# Patient Record
Sex: FEMALE | Race: OTHER | NOT HISPANIC OR LATINO
[De-identification: names, ages, dates, MRNs, and addresses within clinical notes are randomized per-mention and may not be internally consistent; named-entity substitution may affect disease eponyms.]

---

## 2024-03-07 ENCOUNTER — NON-APPOINTMENT (OUTPATIENT)
Age: 59
End: 2024-03-07

## 2024-03-12 ENCOUNTER — APPOINTMENT (OUTPATIENT)
Dept: OBGYN | Facility: CLINIC | Age: 59
End: 2024-03-12
Payer: COMMERCIAL

## 2024-03-12 VITALS
OXYGEN SATURATION: 100 % | DIASTOLIC BLOOD PRESSURE: 79 MMHG | WEIGHT: 216 LBS | HEART RATE: 65 BPM | SYSTOLIC BLOOD PRESSURE: 125 MMHG

## 2024-03-12 DIAGNOSIS — Z82.49 FAMILY HISTORY OF ISCHEMIC HEART DISEASE AND OTHER DISEASES OF THE CIRCULATORY SYSTEM: ICD-10-CM

## 2024-03-12 DIAGNOSIS — Z80.3 FAMILY HISTORY OF MALIGNANT NEOPLASM OF BREAST: ICD-10-CM

## 2024-03-12 DIAGNOSIS — Z83.3 FAMILY HISTORY OF DIABETES MELLITUS: ICD-10-CM

## 2024-03-12 DIAGNOSIS — C90.00 MULTIPLE MYELOMA NOT HAVING ACHIEVED REMISSION: ICD-10-CM

## 2024-03-12 DIAGNOSIS — Z80.1 FAMILY HISTORY OF MALIGNANT NEOPLASM OF TRACHEA, BRONCHUS AND LUNG: ICD-10-CM

## 2024-03-12 PROBLEM — Z00.00 ENCOUNTER FOR PREVENTIVE HEALTH EXAMINATION: Status: ACTIVE | Noted: 2024-03-12

## 2024-03-12 PROCEDURE — 99205 OFFICE O/P NEW HI 60 MIN: CPT

## 2024-03-12 RX ORDER — CHOLECALCIFEROL (VITAMIN D3) 25 MCG
TABLET ORAL
Refills: 0 | Status: ACTIVE | COMMUNITY

## 2024-03-12 RX ORDER — IBUPROFEN 200 MG
0.05 TABLET ORAL
Refills: 0 | Status: ACTIVE | COMMUNITY

## 2024-03-12 RX ORDER — DIPHENHYDRAMINE HCL 25 MG
25 TABLET ORAL
Refills: 0 | Status: ACTIVE | COMMUNITY

## 2024-03-12 RX ORDER — DARATUMUMAB 100 MG/5ML
INJECTION, SOLUTION, CONCENTRATE INTRAVENOUS
Refills: 0 | Status: ACTIVE | COMMUNITY

## 2024-03-12 RX ORDER — POLYVINYL ALCOHOL 1.4 %
1.4 DROPS OPHTHALMIC (EYE)
Refills: 0 | Status: ACTIVE | COMMUNITY

## 2024-03-12 RX ORDER — ACETAMINOPHEN 325 MG/1
325 TABLET ORAL
Refills: 0 | Status: ACTIVE | COMMUNITY

## 2024-03-12 NOTE — COUNSELING
[Lab Results] : lab results [Nutrition/ Exercise/ Weight Management] : nutrition, exercise, weight management [Medication Management] : medication management [Pre/Post Op Instructions] : pre/post op instructions [Other ___] : [unfilled]

## 2024-03-12 NOTE — PHYSICAL EXAM
[Appropriately responsive] : appropriately responsive [Chaperone Present] : A chaperone was present in the examining room during all aspects of the physical examination [Alert] : alert [No Acute Distress] : no acute distress [Oriented x3] : oriented x3

## 2024-03-16 PROBLEM — Z80.1 FAMILY HISTORY OF LUNG CANCER: Status: ACTIVE | Noted: 2024-03-12

## 2024-03-16 PROBLEM — C90.00 MULTIPLE MYELOMA: Status: ACTIVE | Noted: 2024-03-12

## 2024-03-16 PROBLEM — Z80.3 FAMILY HISTORY OF MALIGNANT NEOPLASM OF BREAST: Status: ACTIVE | Noted: 2024-03-12

## 2024-03-16 PROBLEM — Z82.49 FAMILY HISTORY OF HYPERTENSION: Status: ACTIVE | Noted: 2024-03-12

## 2024-03-16 PROBLEM — Z82.49 FAMILY HISTORY OF CARDIAC DISORDER: Status: ACTIVE | Noted: 2024-03-12

## 2024-03-16 PROBLEM — Z83.3 FAMILY HISTORY OF DIABETES MELLITUS: Status: ACTIVE | Noted: 2024-03-12

## 2024-03-16 NOTE — CONSULT LETTER
[Dear  ___] : Dear  [unfilled], [Consult Letter:] : I had the pleasure of evaluating your patient, [unfilled]. [FreeTextEntry2] : Efra Moscoso MD   [address], [address],   [place] [address]

## 2024-03-16 NOTE — HISTORY OF PRESENT ILLNESS
[Patient reported mammogram was normal] : Patient reported mammogram was normal [Patient reported breast sonogram was normal] : Patient reported breast sonogram was normal [Patient reported bone density results were normal] : Patient reported bone density results were normal [Patient reported colonoscopy was normal] : Patient reported colonoscopy was normal [LMP unknown] : LMP unknown [postmenopausal] : postmenopausal [N] : Patient is not sexually active [Y] : Positive pregnancy history [Hot Flashes] : hot flashes [Night Sweats] : night sweats [Insomnia] : insomnia [No] : none [Menarche Age: ____] : age at menarche was [unfilled] [Experiencing Menopausal Sxs] : experiencing menopausal symptoms [Menopause Age: ____] : age at menopause was [unfilled] [Previously active] : previously active [FreeTextEntry1] : 59 yo patient presents for fibroid consultation. Currently she is experiencing pelvic pain and discomfort and urinary frequency. She describes at times she feels pressure and something wants to "drop out"  [Mammogramdate] : 12/28/23 [BreastSonogramDate] : 12/28/23 [BoneDensityDate] : 12/14/23 [ColonoscopyDate] : 04/09/21 [PGHxTotal] : 4 [PGHxAbortions] : 1 [[facility] Hospital[facility]ulerm] : 1 [[facility] Centeriving] : 1 [PGHxABSpont] : 1 [PGxEctopic] : 1

## 2024-03-16 NOTE — DISCUSSION/SUMMARY
[FreeTextEntry1] : I sat down with the patient to discuss the imaging findings & her symptoms which warrant surgical intervention. I explained pressure is likely related to myomata, possible adenomyosis and also enlarged uterus. Major surgical procedure including myomectomy and hysterectomy. We discussed type of hysterectomy including total and supracervical. We reviewed that there is no proven medical benefit to keeping the cervix, and removal in the future is more difficult. Myomectomy has more bleeding and adhesion formation. I ecommend definitive hysterectomy with bilateral salpingectomy via a minimally invasive approach.     The options for surgical approach including open, vaginal, and laparoscopic with or without robotic assistance were discussed and the patient agrees with plan for laparoscopic hysterectomy with bilateral salpingectomy.  The differential diagnosis was discussed in detail. The indications, risks, benefits and alternatives were discussed. Including but not limited to conversion to laparotomy, bleeding, infection, injury to surrounding organs was discussed at length.  She understand that there is increased risk for bladder injury with prior  section. Chance of occult injury and need for future surgery. MARQUIS ADRIENNE expressed an understanding of the treatment rationale and her questions were answered to her apparent satisfaction.  She was given written information about postoperative care and diagrams of the pelvic anatomy.  -immuno compromised clearance from PCP and oncologist -f/u preop visit

## 2024-04-09 ENCOUNTER — APPOINTMENT (OUTPATIENT)
Dept: OBGYN | Facility: CLINIC | Age: 59
End: 2024-04-09
Payer: COMMERCIAL

## 2024-04-09 DIAGNOSIS — Z41.9 ENCOUNTER FOR PROCEDURE FOR PURPOSES OTHER THAN REMEDYING HEALTH STATE, UNSPECIFIED: ICD-10-CM

## 2024-04-09 PROCEDURE — 99214 OFFICE O/P EST MOD 30 MIN: CPT

## 2024-04-09 RX ORDER — NAPROXEN 500 MG/1
500 TABLET ORAL
Qty: 1 | Refills: 2 | Status: ACTIVE | COMMUNITY
Start: 2024-04-09 | End: 1900-01-01

## 2024-04-09 RX ORDER — OXYCODONE 5 MG/1
5 TABLET ORAL
Qty: 5 | Refills: 0 | Status: ACTIVE | COMMUNITY
Start: 2024-04-09 | End: 1900-01-01

## 2024-04-09 NOTE — DISCUSSION/SUMMARY
[FreeTextEntry1] : MARQUIS DELEON 58 year old presents for follow-up prior to surgery. We reviewed her medical clearance. The associated risks, benefits, alternatives of the procedure have been outlined discussed and reviewed with the patient. These risks including but not limited to bleeding, infection, injury to adjacent organs, need for secondary surgery, incisional dehiscence, incisional hernia, change in bowel habits, change in urinary function, nerve injury, change in sexual function/impaired fertility, as well as the risk of heart and lung complications, stroke, DVT/PE and death were detailed.   The possibility of recurrent or continued symptoms, requiring continued medical management despite surgical intervention, was discussed.  We reviewed pain management, diet, enhanced recovery protocol. Medication prescribed.

## 2024-04-18 ENCOUNTER — TRANSCRIPTION ENCOUNTER (OUTPATIENT)
Age: 59
End: 2024-04-18

## 2024-04-18 VITALS
HEART RATE: 68 BPM | WEIGHT: 222.45 LBS | SYSTOLIC BLOOD PRESSURE: 128 MMHG | TEMPERATURE: 98 F | OXYGEN SATURATION: 99 % | HEIGHT: 69 IN | DIASTOLIC BLOOD PRESSURE: 84 MMHG | RESPIRATION RATE: 16 BRPM

## 2024-04-18 RX ORDER — CHOLECALCIFEROL (VITAMIN D3) 125 MCG
1 CAPSULE ORAL
Refills: 0 | DISCHARGE

## 2024-04-18 RX ORDER — ASPIRIN/CALCIUM CARB/MAGNESIUM 324 MG
0 TABLET ORAL
Refills: 0 | DISCHARGE

## 2024-04-18 NOTE — ASU PREOP CHECKLIST - SELECT TESTS ORDERED
HgbA1C - 5.5, Heme Clearance/CBC/CMP/PT/PTT/EKG HgbA1C - 5.5, Heme Clearance/CBC/CMP/PT/PTT/EKG/POCT Blood Glucose

## 2024-04-18 NOTE — ASU PATIENT PROFILE, ADULT - NSICDXPASTSURGICALHX_GEN_ALL_CORE_FT
PAST SURGICAL HISTORY:  H/O cervical spine surgery Titanium Cage C4-C6    H/O foot surgery B/L    History of ectopic pregnancy     S/P thyroid biopsy

## 2024-04-18 NOTE — ASU PATIENT PROFILE, ADULT - NSICDXPASTMEDICALHX_GEN_ALL_CORE_FT
PAST MEDICAL HISTORY:  H/O bleeding disorder     H/O thyroid disease      PAST MEDICAL HISTORY:  HLD (hyperlipidemia)     Multiple myeloma

## 2024-04-18 NOTE — ASU PATIENT PROFILE, ADULT - FALL HARM RISK - UNIVERSAL INTERVENTIONS
Bed in lowest position, wheels locked, appropriate side rails in place/Call bell, personal items and telephone in reach/Instruct patient to call for assistance before getting out of bed or chair/Non-slip footwear when patient is out of bed/[place] to call system/Physically safe environment - no spills, clutter or unnecessary equipment/Purposeful Proactive Rounding/Room/bathroom lighting operational, light cord in reach

## 2024-04-19 ENCOUNTER — RESULT REVIEW (OUTPATIENT)
Age: 59
End: 2024-04-19

## 2024-04-19 ENCOUNTER — APPOINTMENT (OUTPATIENT)
Dept: OBGYN | Facility: HOSPITAL | Age: 59
End: 2024-04-19

## 2024-04-19 ENCOUNTER — TRANSCRIPTION ENCOUNTER (OUTPATIENT)
Age: 59
End: 2024-04-19

## 2024-04-19 ENCOUNTER — OUTPATIENT (OUTPATIENT)
Dept: OUTPATIENT SERVICES | Facility: HOSPITAL | Age: 59
LOS: 1 days | Discharge: HOME | End: 2024-04-19
Payer: COMMERCIAL

## 2024-04-19 VITALS
DIASTOLIC BLOOD PRESSURE: 70 MMHG | TEMPERATURE: 98 F | SYSTOLIC BLOOD PRESSURE: 130 MMHG | HEART RATE: 79 BPM | OXYGEN SATURATION: 100 % | RESPIRATION RATE: 19 BRPM

## 2024-04-19 DIAGNOSIS — Z98.890 OTHER SPECIFIED POSTPROCEDURAL STATES: Chronic | ICD-10-CM

## 2024-04-19 DIAGNOSIS — Z87.59 PERSONAL HISTORY OF OTHER COMPLICATIONS OF PREGNANCY, CHILDBIRTH AND THE PUERPERIUM: Chronic | ICD-10-CM

## 2024-04-19 LAB
BLD GP AB SCN SERPL QL: POSITIVE — SIGNIFICANT CHANGE UP
GLUCOSE BLDC GLUCOMTR-MCNC: 80 MG/DL — SIGNIFICANT CHANGE UP (ref 70–99)
RH IG SCN BLD-IMP: POSITIVE — SIGNIFICANT CHANGE UP

## 2024-04-19 PROCEDURE — 86901 BLOOD TYPING SEROLOGIC RH(D): CPT

## 2024-04-19 PROCEDURE — 88302 TISSUE EXAM BY PATHOLOGIST: CPT

## 2024-04-19 PROCEDURE — 88307 TISSUE EXAM BY PATHOLOGIST: CPT

## 2024-04-19 PROCEDURE — 86880 COOMBS TEST DIRECT: CPT

## 2024-04-19 PROCEDURE — 88307 TISSUE EXAM BY PATHOLOGIST: CPT | Mod: 26

## 2024-04-19 PROCEDURE — 86850 RBC ANTIBODY SCREEN: CPT

## 2024-04-19 PROCEDURE — 86870 RBC ANTIBODY IDENTIFICATION: CPT

## 2024-04-19 PROCEDURE — 88304 TISSUE EXAM BY PATHOLOGIST: CPT

## 2024-04-19 PROCEDURE — 88304 TISSUE EXAM BY PATHOLOGIST: CPT | Mod: 26

## 2024-04-19 PROCEDURE — 86077 PHYS BLOOD BANK SERV XMATCH: CPT

## 2024-04-19 PROCEDURE — 86900 BLOOD TYPING SEROLOGIC ABO: CPT

## 2024-04-19 PROCEDURE — 86970 RBC PRETX INCUBATJ W/CHEMICL: CPT

## 2024-04-19 PROCEDURE — 58573 TLH W/T/O UTERUS OVER 250 G: CPT | Mod: 22

## 2024-04-19 PROCEDURE — 82962 GLUCOSE BLOOD TEST: CPT

## 2024-04-19 PROCEDURE — 58573 TLH W/T/O UTERUS OVER 250 G: CPT

## 2024-04-19 PROCEDURE — 88302 TISSUE EXAM BY PATHOLOGIST: CPT | Mod: 26

## 2024-04-19 PROCEDURE — C1889: CPT

## 2024-04-19 DEVICE — SURGICEL POWDER 3 GRAMS: Type: IMPLANTABLE DEVICE | Status: FUNCTIONAL

## 2024-04-19 RX ORDER — ACETAMINOPHEN 500 MG
1000 TABLET ORAL ONCE
Refills: 0 | Status: COMPLETED | OUTPATIENT
Start: 2024-04-19 | End: 2024-04-19

## 2024-04-19 RX ORDER — OXYCODONE HYDROCHLORIDE 5 MG/1
5 TABLET ORAL EVERY 4 HOURS
Refills: 0 | Status: DISCONTINUED | OUTPATIENT
Start: 2024-04-19 | End: 2024-04-19

## 2024-04-19 RX ORDER — SIMETHICONE 80 MG/1
80 TABLET, CHEWABLE ORAL EVERY 6 HOURS
Refills: 0 | Status: DISCONTINUED | OUTPATIENT
Start: 2024-04-19 | End: 2024-04-19

## 2024-04-19 RX ORDER — ZOLPIDEM TARTRATE 10 MG/1
1 TABLET ORAL
Refills: 0 | DISCHARGE

## 2024-04-19 RX ORDER — HYDROMORPHONE HYDROCHLORIDE 2 MG/ML
0.5 INJECTION INTRAMUSCULAR; INTRAVENOUS; SUBCUTANEOUS
Refills: 0 | Status: DISCONTINUED | OUTPATIENT
Start: 2024-04-19 | End: 2024-04-19

## 2024-04-19 RX ORDER — CELECOXIB 200 MG/1
400 CAPSULE ORAL ONCE
Refills: 0 | Status: COMPLETED | OUTPATIENT
Start: 2024-04-19 | End: 2024-04-19

## 2024-04-19 RX ORDER — ACETAMINOPHEN 500 MG
2 TABLET ORAL
Refills: 0 | DISCHARGE

## 2024-04-19 RX ORDER — ACETAMINOPHEN 500 MG
1000 TABLET ORAL EVERY 6 HOURS
Refills: 0 | Status: DISCONTINUED | OUTPATIENT
Start: 2024-04-19 | End: 2024-04-19

## 2024-04-19 RX ORDER — HEPARIN SODIUM 5000 [USP'U]/ML
5000 INJECTION INTRAVENOUS; SUBCUTANEOUS ONCE
Refills: 0 | Status: COMPLETED | OUTPATIENT
Start: 2024-04-19 | End: 2024-04-19

## 2024-04-19 RX ORDER — SODIUM CHLORIDE 9 MG/ML
1000 INJECTION, SOLUTION INTRAVENOUS
Refills: 0 | Status: DISCONTINUED | OUTPATIENT
Start: 2024-04-19 | End: 2024-04-19

## 2024-04-19 RX ORDER — ONDANSETRON 8 MG/1
8 TABLET, FILM COATED ORAL EVERY 6 HOURS
Refills: 0 | Status: DISCONTINUED | OUTPATIENT
Start: 2024-04-19 | End: 2024-04-19

## 2024-04-19 RX ORDER — OXYCODONE HYDROCHLORIDE 5 MG/1
10 TABLET ORAL EVERY 4 HOURS
Refills: 0 | Status: DISCONTINUED | OUTPATIENT
Start: 2024-04-19 | End: 2024-04-19

## 2024-04-19 RX ORDER — KETOROLAC TROMETHAMINE 30 MG/ML
30 SYRINGE (ML) INJECTION EVERY 6 HOURS
Refills: 0 | Status: DISCONTINUED | OUTPATIENT
Start: 2024-04-19 | End: 2024-04-19

## 2024-04-19 RX ORDER — DEXAMETHASONE 0.5 MG/5ML
10 ELIXIR ORAL
Refills: 0 | DISCHARGE

## 2024-04-19 RX ORDER — METOCLOPRAMIDE HCL 10 MG
10 TABLET ORAL EVERY 6 HOURS
Refills: 0 | Status: DISCONTINUED | OUTPATIENT
Start: 2024-04-19 | End: 2024-04-19

## 2024-04-19 RX ADMIN — CELECOXIB 400 MILLIGRAM(S): 200 CAPSULE ORAL at 07:09

## 2024-04-19 RX ADMIN — HEPARIN SODIUM 5000 UNIT(S): 5000 INJECTION INTRAVENOUS; SUBCUTANEOUS at 07:10

## 2024-04-19 RX ADMIN — Medication 10 MILLIGRAM(S): at 16:10

## 2024-04-19 RX ADMIN — ONDANSETRON 8 MILLIGRAM(S): 8 TABLET, FILM COATED ORAL at 13:24

## 2024-04-19 RX ADMIN — Medication 400 MILLIGRAM(S): at 14:51

## 2024-04-19 RX ADMIN — SIMETHICONE 80 MILLIGRAM(S): 80 TABLET, CHEWABLE ORAL at 14:54

## 2024-04-19 RX ADMIN — Medication 1000 MILLIGRAM(S): at 07:09

## 2024-04-19 NOTE — ASU DISCHARGE PLAN (ADULT/PEDIATRIC) - ASU DC SPECIAL INSTRUCTIONSFT
- Please remove umbilical dressing in 24 hours.   - Please call the office for a follow-up with your doctor in 2 weeks  - Please contact the office for any pain uncontrolled by medication, excessive bleeding or Fever>100.4  - You can take ibuprofen 600mg every 6 hours and tylenol 975mg every 6 hours as needed for pain.  - Oxycodone should be used for severe pain only  - You may walk and climb stairs as often as youd like, no vigorous activity, do not lift anything greater than 10lbs, nothing per vagina x 4 weeks, do not drive while on pain medication.

## 2024-04-19 NOTE — ASU DISCHARGE PLAN (ADULT/PEDIATRIC) - CARE PROVIDER_API CALL
Pedro Marin  Obstetrics and Gynecology  [address] [place] [id number]  Phone: [phone]  Fax: [phone]  Established Patient  Follow Up Time: 2 weeks

## 2024-04-19 NOTE — PROGRESS NOTE ADULT - ASSESSMENT
A/P:  58 year old P1 s/p [facility] BS cysto for fibroids, 16 week size uterus, EDEL (many many), enterolysis, negative leak test. Clinically and hemodynamically stable, meeting postop milestones.    Encourage ambulation  Discharge home in stable condition  Return precautions given

## 2024-04-19 NOTE — BRIEF OPERATIVE NOTE - OPERATION/FINDINGS
Significant intraabdominal adhesions of large and small bowel to anterior and posterior uterus lysed with ligasure. Unable to conclusively identify L tube and ovary. Total Laparoscopic Hysterectomy/R salpingectomy performed with Ligasure. Cysto showed bilateral ureteral jets.

## 2024-04-19 NOTE — ASU DISCHARGE PLAN (ADULT/PEDIATRIC) - NS MD DC FALL RISK RISK
For information on Fall & Injury Prevention, visit: [email or web address] OR  [email or web address] OR  https://www.cdc.gov/steadi/patient.html

## 2024-04-19 NOTE — BRIEF OPERATIVE NOTE - NSICDXBRIEFPROCEDURE_GEN_ALL_CORE_FT
PROCEDURES:  Laparoscopic total hysterectomy with bilateral salpingectomy and cystoscopy 19-Apr-2024 11:47:22  Talita Mayo  Laparoscopic lysis of adhesions with salpingectomy 19-Apr-2024 11:48:34  Talita Mayo

## 2024-04-19 NOTE — PROGRESS NOTE ADULT - SUBJECTIVE AND OBJECTIVE BOX
Patient evaluated at bedside this morning, resting comfortable in chair. She reports pain is well-controlled.  She denies headache, dizziness, changes in vision, chest pain, palpitations, shortness of breath, RUQ pain, nausea, vomiting, fever, chills, heavy vaginal bleeding.  She has been ambulating without assistance, voiding spontaneously, tolerating PO.      Physical Exam:  T(C): 36.3 (04-19-24 @ 11:55), Max: 36.8 (04-19-24 @ 07:29)  HR: 86 (04-19-24 @ 14:55) (56 - 86)  BP: 124/71 (04-19-24 @ 14:55) (100/60 - 137/81)  RR: 16 (04-19-24 @ 14:55) (16 - 19)  SpO2: 100% (04-19-24 @ 14:55) (97% - 100%)    Gen: no apparent distress  Pulm: no increased work of breathing  Abd: soft, nontender, nondistended, no rebound or guarding, uterus firm; incisions clean dry intact  Extremities: trace pedal edema bilaterally, no calf tenderness bilaterally            acetaminophen     Tablet .. 1000 milliGRAM(s) Oral every 6 hours  HYDROmorphone  Injectable 0.5 milliGRAM(s) IV Push every 15 minutes PRN  ketorolac   Injectable 30 milliGRAM(s) IV Push every 6 hours  lactated ringers. 1000 milliLiter(s) IV Continuous <Continuous>  metoclopramide Injectable 10 milliGRAM(s) IV Push every 6 hours PRN  ondansetron Injectable 8 milliGRAM(s) IV Push every 6 hours PRN  oxyCODONE    IR 10 milliGRAM(s) Oral every 4 hours PRN  oxyCODONE    IR 5 milliGRAM(s) Oral every 4 hours PRN  simethicone 80 milliGRAM(s) Chew every 6 hours PRN

## 2024-04-22 ENCOUNTER — NON-APPOINTMENT (OUTPATIENT)
Age: 59
End: 2024-04-22

## 2024-04-23 ENCOUNTER — INPATIENT (INPATIENT)
Facility: HOSPITAL | Age: 59
LOS: 2 days | Discharge: HOME | End: 2024-04-26
Attending: OBSTETRICS & GYNECOLOGY | Admitting: OBSTETRICS & GYNECOLOGY
Payer: COMMERCIAL

## 2024-04-23 ENCOUNTER — EMERGENCY (EMERGENCY)
Facility: HOSPITAL | Age: 59
LOS: 1 days | Discharge: ANOTHER HOSPITAL | End: 2024-04-23
Attending: EMERGENCY MEDICINE
Payer: COMMERCIAL

## 2024-04-23 VITALS
DIASTOLIC BLOOD PRESSURE: 74 MMHG | SYSTOLIC BLOOD PRESSURE: 130 MMHG | OXYGEN SATURATION: 98 % | HEART RATE: 75 BPM | TEMPERATURE: 99 F | RESPIRATION RATE: 18 BRPM

## 2024-04-23 VITALS
OXYGEN SATURATION: 98 % | RESPIRATION RATE: 17 BRPM | DIASTOLIC BLOOD PRESSURE: 89 MMHG | HEART RATE: 91 BPM | SYSTOLIC BLOOD PRESSURE: 160 MMHG

## 2024-04-23 VITALS
SYSTOLIC BLOOD PRESSURE: 134 MMHG | RESPIRATION RATE: 16 BRPM | OXYGEN SATURATION: 98 % | WEIGHT: 210.1 LBS | HEART RATE: 80 BPM | HEIGHT: 69 IN | DIASTOLIC BLOOD PRESSURE: 89 MMHG | TEMPERATURE: 98 F

## 2024-04-23 DIAGNOSIS — Z98.890 OTHER SPECIFIED POSTPROCEDURAL STATES: Chronic | ICD-10-CM

## 2024-04-23 DIAGNOSIS — Z87.59 PERSONAL HISTORY OF OTHER COMPLICATIONS OF PREGNANCY, CHILDBIRTH AND THE PUERPERIUM: Chronic | ICD-10-CM

## 2024-04-23 DIAGNOSIS — K56.609 UNSPECIFIED INTESTINAL OBSTRUCTION, UNSPECIFIED AS TO PARTIAL VERSUS COMPLETE OBSTRUCTION: ICD-10-CM

## 2024-04-23 LAB
ALBUMIN SERPL ELPH-MCNC: 3.5 G/DL — SIGNIFICANT CHANGE UP (ref 3.5–5)
ALBUMIN SERPL ELPH-MCNC: 3.8 G/DL — SIGNIFICANT CHANGE UP (ref 3.3–5)
ALP SERPL-CCNC: 74 U/L — SIGNIFICANT CHANGE UP (ref 40–120)
ALP SERPL-CCNC: 78 U/L — SIGNIFICANT CHANGE UP (ref 40–120)
ALT FLD-CCNC: 14 U/L — SIGNIFICANT CHANGE UP (ref 10–45)
ALT FLD-CCNC: 32 U/L DA — SIGNIFICANT CHANGE UP (ref 10–60)
ANION GAP SERPL CALC-SCNC: 11 MMOL/L — SIGNIFICANT CHANGE UP (ref 5–17)
ANION GAP SERPL CALC-SCNC: 4 MMOL/L — LOW (ref 5–17)
APPEARANCE UR: CLEAR — SIGNIFICANT CHANGE UP
AST SERPL-CCNC: 17 U/L — SIGNIFICANT CHANGE UP (ref 10–40)
AST SERPL-CCNC: 36 U/L — SIGNIFICANT CHANGE UP (ref 10–40)
BACTERIA # UR AUTO: ABNORMAL /HPF
BASOPHILS # BLD AUTO: 0.01 K/UL — SIGNIFICANT CHANGE UP (ref 0–0.2)
BASOPHILS # BLD AUTO: 0.01 K/UL — SIGNIFICANT CHANGE UP (ref 0–0.2)
BASOPHILS NFR BLD AUTO: 0.2 % — SIGNIFICANT CHANGE UP (ref 0–2)
BASOPHILS NFR BLD AUTO: 0.2 % — SIGNIFICANT CHANGE UP (ref 0–2)
BILIRUB SERPL-MCNC: 0.6 MG/DL — SIGNIFICANT CHANGE UP (ref 0.2–1.2)
BILIRUB SERPL-MCNC: 0.6 MG/DL — SIGNIFICANT CHANGE UP (ref 0.2–1.2)
BILIRUB UR-MCNC: NEGATIVE — SIGNIFICANT CHANGE UP
BLD GP AB SCN SERPL QL: POSITIVE — SIGNIFICANT CHANGE UP
BUN SERPL-MCNC: 11 MG/DL — SIGNIFICANT CHANGE UP (ref 7–18)
BUN SERPL-MCNC: 9 MG/DL — SIGNIFICANT CHANGE UP (ref 7–23)
CALCIUM SERPL-MCNC: 9.5 MG/DL — SIGNIFICANT CHANGE UP (ref 8.4–10.5)
CALCIUM SERPL-MCNC: 9.9 MG/DL — SIGNIFICANT CHANGE UP (ref 8.4–10.5)
CHLORIDE SERPL-SCNC: 108 MMOL/L — SIGNIFICANT CHANGE UP (ref 96–108)
CHLORIDE SERPL-SCNC: 109 MMOL/L — HIGH (ref 96–108)
CO2 SERPL-SCNC: 26 MMOL/L — SIGNIFICANT CHANGE UP (ref 22–31)
CO2 SERPL-SCNC: 26 MMOL/L — SIGNIFICANT CHANGE UP (ref 22–31)
COLOR SPEC: YELLOW — SIGNIFICANT CHANGE UP
COMMENT - URINE 2: SIGNIFICANT CHANGE UP
CREAT SERPL-MCNC: 0.85 MG/DL — SIGNIFICANT CHANGE UP (ref 0.5–1.3)
CREAT SERPL-MCNC: 0.95 MG/DL — SIGNIFICANT CHANGE UP (ref 0.5–1.3)
DIFF PNL FLD: ABNORMAL
EGFR: 69 ML/MIN/1.73M2 — SIGNIFICANT CHANGE UP
EGFR: 79 ML/MIN/1.73M2 — SIGNIFICANT CHANGE UP
EOSINOPHIL # BLD AUTO: 0 K/UL — SIGNIFICANT CHANGE UP (ref 0–0.5)
EOSINOPHIL # BLD AUTO: 0.03 K/UL — SIGNIFICANT CHANGE UP (ref 0–0.5)
EOSINOPHIL NFR BLD AUTO: 0 % — SIGNIFICANT CHANGE UP (ref 0–6)
EOSINOPHIL NFR BLD AUTO: 0.5 % — SIGNIFICANT CHANGE UP (ref 0–6)
EPI CELLS # UR: PRESENT
GLUCOSE SERPL-MCNC: 104 MG/DL — HIGH (ref 70–99)
GLUCOSE SERPL-MCNC: 120 MG/DL — HIGH (ref 70–99)
GLUCOSE UR QL: NEGATIVE MG/DL — SIGNIFICANT CHANGE UP
HCT VFR BLD CALC: 35.9 % — SIGNIFICANT CHANGE UP (ref 34.5–45)
HCT VFR BLD CALC: 43 % — SIGNIFICANT CHANGE UP (ref 34.5–45)
HGB BLD-MCNC: 12.1 G/DL — SIGNIFICANT CHANGE UP (ref 11.5–15.5)
HGB BLD-MCNC: 13.9 G/DL — SIGNIFICANT CHANGE UP (ref 11.5–15.5)
HYALINE CASTS # UR AUTO: PRESENT
IMM GRANULOCYTES NFR BLD AUTO: 0.2 % — SIGNIFICANT CHANGE UP (ref 0–0.9)
IMM GRANULOCYTES NFR BLD AUTO: 0.3 % — SIGNIFICANT CHANGE UP (ref 0–0.9)
KETONES UR-MCNC: ABNORMAL MG/DL
LEUKOCYTE ESTERASE UR-ACNC: ABNORMAL
LIDOCAIN IGE QN: 21 U/L — SIGNIFICANT CHANGE UP (ref 13–75)
LYMPHOCYTES # BLD AUTO: 0.77 K/UL — LOW (ref 1–3.3)
LYMPHOCYTES # BLD AUTO: 1.61 K/UL — SIGNIFICANT CHANGE UP (ref 1–3.3)
LYMPHOCYTES # BLD AUTO: 11.7 % — LOW (ref 13–44)
LYMPHOCYTES # BLD AUTO: 25.3 % — SIGNIFICANT CHANGE UP (ref 13–44)
MAGNESIUM SERPL-MCNC: 2.2 MG/DL — SIGNIFICANT CHANGE UP (ref 1.6–2.6)
MCHC RBC-ENTMCNC: 30.9 PG — SIGNIFICANT CHANGE UP (ref 27–34)
MCHC RBC-ENTMCNC: 31.5 PG — SIGNIFICANT CHANGE UP (ref 27–34)
MCHC RBC-ENTMCNC: 32.3 GM/DL — SIGNIFICANT CHANGE UP (ref 32–36)
MCHC RBC-ENTMCNC: 33.7 GM/DL — SIGNIFICANT CHANGE UP (ref 32–36)
MCV RBC AUTO: 93.5 FL — SIGNIFICANT CHANGE UP (ref 80–100)
MCV RBC AUTO: 95.6 FL — SIGNIFICANT CHANGE UP (ref 80–100)
MONOCYTES # BLD AUTO: 0.19 K/UL — SIGNIFICANT CHANGE UP (ref 0–0.9)
MONOCYTES # BLD AUTO: 0.67 K/UL — SIGNIFICANT CHANGE UP (ref 0–0.9)
MONOCYTES NFR BLD AUTO: 10.5 % — SIGNIFICANT CHANGE UP (ref 2–14)
MONOCYTES NFR BLD AUTO: 2.9 % — SIGNIFICANT CHANGE UP (ref 2–14)
NEUTROPHILS # BLD AUTO: 4.03 K/UL — SIGNIFICANT CHANGE UP (ref 1.8–7.4)
NEUTROPHILS # BLD AUTO: 5.58 K/UL — SIGNIFICANT CHANGE UP (ref 1.8–7.4)
NEUTROPHILS NFR BLD AUTO: 63.2 % — SIGNIFICANT CHANGE UP (ref 43–77)
NEUTROPHILS NFR BLD AUTO: 85 % — HIGH (ref 43–77)
NITRITE UR-MCNC: NEGATIVE — SIGNIFICANT CHANGE UP
NRBC # BLD: 0 /100 WBCS — SIGNIFICANT CHANGE UP (ref 0–0)
NRBC # BLD: 0 /100 WBCS — SIGNIFICANT CHANGE UP (ref 0–0)
PH UR: 6.5 — SIGNIFICANT CHANGE UP (ref 5–8)
PHOSPHATE SERPL-MCNC: 3.4 MG/DL — SIGNIFICANT CHANGE UP (ref 2.5–4.5)
PLATELET # BLD AUTO: 170 K/UL — SIGNIFICANT CHANGE UP (ref 150–400)
PLATELET # BLD AUTO: 185 K/UL — SIGNIFICANT CHANGE UP (ref 150–400)
POTASSIUM SERPL-MCNC: 3.7 MMOL/L — SIGNIFICANT CHANGE UP (ref 3.5–5.3)
POTASSIUM SERPL-MCNC: 5.3 MMOL/L — SIGNIFICANT CHANGE UP (ref 3.5–5.3)
POTASSIUM SERPL-SCNC: 3.7 MMOL/L — SIGNIFICANT CHANGE UP (ref 3.5–5.3)
POTASSIUM SERPL-SCNC: 5.3 MMOL/L — SIGNIFICANT CHANGE UP (ref 3.5–5.3)
PROT SERPL-MCNC: 5.7 G/DL — LOW (ref 6–8.3)
PROT SERPL-MCNC: 6.8 G/DL — SIGNIFICANT CHANGE UP (ref 6–8.3)
PROT UR-MCNC: NEGATIVE MG/DL — SIGNIFICANT CHANGE UP
RBC # BLD: 3.84 M/UL — SIGNIFICANT CHANGE UP (ref 3.8–5.2)
RBC # BLD: 4.5 M/UL — SIGNIFICANT CHANGE UP (ref 3.8–5.2)
RBC # FLD: 15 % — HIGH (ref 10.3–14.5)
RBC # FLD: 15.1 % — HIGH (ref 10.3–14.5)
RBC CASTS # UR COMP ASSIST: 3 /HPF — SIGNIFICANT CHANGE UP (ref 0–4)
RH IG SCN BLD-IMP: POSITIVE — SIGNIFICANT CHANGE UP
SODIUM SERPL-SCNC: 139 MMOL/L — SIGNIFICANT CHANGE UP (ref 135–145)
SODIUM SERPL-SCNC: 145 MMOL/L — SIGNIFICANT CHANGE UP (ref 135–145)
SP GR SPEC: 1.02 — SIGNIFICANT CHANGE UP (ref 1–1.03)
UROBILINOGEN FLD QL: 0.2 MG/DL — SIGNIFICANT CHANGE UP (ref 0.2–1)
WBC # BLD: 6.37 K/UL — SIGNIFICANT CHANGE UP (ref 3.8–10.5)
WBC # BLD: 6.56 K/UL — SIGNIFICANT CHANGE UP (ref 3.8–10.5)
WBC # FLD AUTO: 6.37 K/UL — SIGNIFICANT CHANGE UP (ref 3.8–10.5)
WBC # FLD AUTO: 6.56 K/UL — SIGNIFICANT CHANGE UP (ref 3.8–10.5)
WBC UR QL: 3 /HPF — SIGNIFICANT CHANGE UP (ref 0–5)

## 2024-04-23 PROCEDURE — 71045 X-RAY EXAM CHEST 1 VIEW: CPT | Mod: 26

## 2024-04-23 PROCEDURE — 99291 CRITICAL CARE FIRST HOUR: CPT

## 2024-04-23 PROCEDURE — 99283 EMERGENCY DEPT VISIT LOW MDM: CPT

## 2024-04-23 PROCEDURE — 99223 1ST HOSP IP/OBS HIGH 75: CPT | Mod: 57

## 2024-04-23 PROCEDURE — ZZZZZ: CPT

## 2024-04-23 PROCEDURE — 74177 CT ABD & PELVIS W/CONTRAST: CPT | Mod: 26,MC

## 2024-04-23 RX ORDER — SODIUM CHLORIDE 9 MG/ML
1000 INJECTION INTRAMUSCULAR; INTRAVENOUS; SUBCUTANEOUS ONCE
Refills: 0 | Status: COMPLETED | OUTPATIENT
Start: 2024-04-23 | End: 2024-04-23

## 2024-04-23 RX ORDER — ACETAMINOPHEN 500 MG
1000 TABLET ORAL ONCE
Refills: 0 | Status: COMPLETED | OUTPATIENT
Start: 2024-04-23 | End: 2024-04-23

## 2024-04-23 RX ORDER — MORPHINE SULFATE 50 MG/1
4 CAPSULE, EXTENDED RELEASE ORAL ONCE
Refills: 0 | Status: DISCONTINUED | OUTPATIENT
Start: 2024-04-23 | End: 2024-04-23

## 2024-04-23 RX ORDER — PANTOPRAZOLE SODIUM 20 MG/1
40 TABLET, DELAYED RELEASE ORAL EVERY 24 HOURS
Refills: 0 | Status: DISCONTINUED | OUTPATIENT
Start: 2024-04-24 | End: 2024-04-26

## 2024-04-23 RX ORDER — HEPARIN SODIUM 5000 [USP'U]/ML
5000 INJECTION INTRAVENOUS; SUBCUTANEOUS EVERY 8 HOURS
Refills: 0 | Status: DISCONTINUED | OUTPATIENT
Start: 2024-04-23 | End: 2024-04-23

## 2024-04-23 RX ORDER — ACETAMINOPHEN 500 MG
1000 TABLET ORAL ONCE
Refills: 0 | Status: COMPLETED | OUTPATIENT
Start: 2024-04-23 | End: 2024-04-25

## 2024-04-23 RX ORDER — SODIUM CHLORIDE 9 MG/ML
1000 INJECTION, SOLUTION INTRAVENOUS
Refills: 0 | Status: DISCONTINUED | OUTPATIENT
Start: 2024-04-23 | End: 2024-04-25

## 2024-04-23 RX ORDER — ONDANSETRON 8 MG/1
4 TABLET, FILM COATED ORAL ONCE
Refills: 0 | Status: COMPLETED | OUTPATIENT
Start: 2024-04-23 | End: 2024-04-23

## 2024-04-23 RX ORDER — ONDANSETRON 8 MG/1
8 TABLET, FILM COATED ORAL EVERY 6 HOURS
Refills: 0 | Status: DISCONTINUED | OUTPATIENT
Start: 2024-04-23 | End: 2024-04-26

## 2024-04-23 RX ORDER — HEPARIN SODIUM 5000 [USP'U]/ML
5000 INJECTION INTRAVENOUS; SUBCUTANEOUS EVERY 8 HOURS
Refills: 0 | Status: DISCONTINUED | OUTPATIENT
Start: 2024-04-23 | End: 2024-04-26

## 2024-04-23 RX ADMIN — SODIUM CHLORIDE 1000 MILLILITER(S): 9 INJECTION INTRAMUSCULAR; INTRAVENOUS; SUBCUTANEOUS at 15:27

## 2024-04-23 RX ADMIN — SODIUM CHLORIDE 125 MILLILITER(S): 9 INJECTION, SOLUTION INTRAVENOUS at 21:22

## 2024-04-23 RX ADMIN — Medication 1000 MILLIGRAM(S): at 16:08

## 2024-04-23 RX ADMIN — HEPARIN SODIUM 5000 UNIT(S): 5000 INJECTION INTRAVENOUS; SUBCUTANEOUS at 23:16

## 2024-04-23 RX ADMIN — ONDANSETRON 4 MILLIGRAM(S): 8 TABLET, FILM COATED ORAL at 08:54

## 2024-04-23 RX ADMIN — Medication 1000 MILLIGRAM(S): at 09:14

## 2024-04-23 RX ADMIN — Medication 400 MILLIGRAM(S): at 08:59

## 2024-04-23 RX ADMIN — ONDANSETRON 4 MILLIGRAM(S): 8 TABLET, FILM COATED ORAL at 15:22

## 2024-04-23 RX ADMIN — SODIUM CHLORIDE 1000 MILLILITER(S): 9 INJECTION INTRAMUSCULAR; INTRAVENOUS; SUBCUTANEOUS at 10:08

## 2024-04-23 RX ADMIN — Medication 1000 MILLIGRAM(S): at 16:23

## 2024-04-23 RX ADMIN — SODIUM CHLORIDE 1000 MILLILITER(S): 9 INJECTION INTRAMUSCULAR; INTRAVENOUS; SUBCUTANEOUS at 16:27

## 2024-04-23 RX ADMIN — Medication 1000 MILLIGRAM(S): at 09:29

## 2024-04-23 RX ADMIN — SODIUM CHLORIDE 1000 MILLILITER(S): 9 INJECTION INTRAMUSCULAR; INTRAVENOUS; SUBCUTANEOUS at 08:49

## 2024-04-23 RX ADMIN — Medication 400 MILLIGRAM(S): at 15:53

## 2024-04-23 NOTE — CONSULT NOTE ADULT - TIME BILLING
evaluation and management of small bowel obstruction, review of labs and imaging, discussion with patient, discussion with consultants, documentation

## 2024-04-23 NOTE — CONSULT NOTE ADULT - PROBLEM SELECTOR RECOMMENDATION 9
- patient will be followed by general surgery   - no gyn intervention at this time   - gyn team available if patient condition changes   - patient evaluated with Dr. Mccoy, house attending

## 2024-04-23 NOTE — CONSULT NOTE ADULT - ATTENDING COMMENTS
Patient is a 59 year old woman with history of hysterectomy on 4/19/24 who developed pain, nausea, vomiting over past day and has clinical, laboratory, radiographic findings concerning for small bowel obstruction. At time of evaluation, afebrile, hemodynamically stable, abdomen soft, minimal tenderness, mild distension, no rebound or guarding, incisions c/d/i. CT imaging reviewed demonstrates transition point in pelvis, post operative changes, not concerning for ischemia. Plan for admission, bowel rest, IV fluids, NG tube decompression, awaiting return of bowel function. Late entry, date of service 4/23/24.

## 2024-04-23 NOTE — H&P ADULT - ASSESSMENT
Pt is a 60 yo P0  POD4 s/p [facility] BS now presenting from [place] ED for high grade SBO.  - clinically and hemodynamically stable  - Obtain general surgery consult - for now, conservative management  - NPO w/ IV fluids and NGT to LIWS      Discussed with PGY4 Dr. Corrales and attending Dr. Marin.   Riya Jacinto, PGY2

## 2024-04-23 NOTE — CONSULT NOTE ADULT - ASSESSMENT
A/P: 58 yo POD#4 s/p total laparoscopic hysterectomy/bilateral salpingectomy presents to the ED for nausea, vomiting and abdominal pain x 1 day   Patient appears to have bowel obstruction  No gynecologic condition present at this time    - patient will be followed by general surgery   - no gyn intervention at this time   - gyn team available if patient condition changes   - patient evaluated with Dr. Mccoy, house attending      A/P: 60 yo POD#4 s/p total laparoscopic hysterectomy/bilateral salpingectomy presents to the ED for nausea, vomiting and abdominal pain x 1 day   Patient appears to have bowel obstruction, no leukocytosis, no suspicion of bowel perforation   No gynecologic condition present at this time    - patient will be followed by general surgery   - no gyn intervention at this time   - gyn team available if patient condition changes   - patient evaluated with Dr. Mccoy, house attending

## 2024-04-23 NOTE — H&P ADULT - HISTORY OF PRESENT ILLNESS
Pt is a 58 yo P0  POD4 s/p [facility] BS now presenting from [place] ED for high grade SBO. She states she was feeling well post operatively, only requiring tylenol for two days. Today she started to have severe abdominal pain, > 20 episodes of emesis, and one episode of diarrhea.        OB/GYN Hx: G1 - ectopic pregnancy.   PMHx: multiple myeloma, stopped medications 4/4 prior to surgery  SHx: UAE, L/S salpingectomy, cervical vertebral fusion  Meds: reglamid  10 mg, daratumumab, ASA 81, benadryl for insomnia  Allergies: NKDA    PHYSICAL EXAM:   Vital Signs Last 24 Hrs  T(C): --  T(F): --  HR: 91 (23 Apr 2024 21:00) (91 - 91)  BP: 160/89 (23 Apr 2024 21:00) (160/89 - 160/89)  BP(mean): 113 (23 Apr 2024 21:00) (113 - 113)  RR: 17 (23 Apr 2024 21:00) (17 - 17)  SpO2: 98% (23 Apr 2024 21:00) (98% - 98%)    Parameters below as of 23 Apr 2024 21:00  Patient On (Oxygen Delivery Method): room air        **************************  Constitutional: Alert & Oriented x3, No acute distress  Respiratory: no increased WOB  Gastrointestinal: soft, non tender, nondistended, no rebound or guarding. Lower abdominal bowel sounds hypoactive, RUQ BS hyperactive.   Extremities: no calf tenderness or swelling    LABS:                  RADIOLOGY & ADDITIONAL STUDIES:   Pt is a 60 yo P0  POD4 s/p [facility] BS now presenting from [place] ED for high grade SBO. She states she was feeling well post operatively, only requiring tylenol for two days. Today she started to have severe abdominal pain, > 20 episodes of emesis, and one episode of diarrhea. She had a CT performed in the ED showing high grade SBO and a NG tube was placed. She has not had emesis since then.        OB/GYN Hx: G1 - ectopic pregnancy.   PMHx: multiple myeloma, stopped medications 4/4 prior to surgery  SHx: UAE, L/S salpingectomy, cervical vertebral fusion, 4/19/24 [facility] BS  Meds: reglamid  10 mg, daratumumab, ASA 81, benadryl for insomnia  Allergies: NKDA    PHYSICAL EXAM:   Vital Signs Last 24 Hrs  T(C): --  T(F): --  HR: 91 (23 Apr 2024 21:00) (91 - 91)  BP: 160/89 (23 Apr 2024 21:00) (160/89 - 160/89)  BP(mean): 113 (23 Apr 2024 21:00) (113 - 113)  RR: 17 (23 Apr 2024 21:00) (17 - 17)  SpO2: 98% (23 Apr 2024 21:00) (98% - 98%)    Parameters below as of 23 Apr 2024 21:00  Patient On (Oxygen Delivery Method): room air        **************************  Constitutional: Alert & Oriented x3, No acute distress  Respiratory: no increased WOB  Gastrointestinal: soft, non tender, nondistended, no rebound or guarding. Lower abdominal bowel sounds hypoactive, RUQ BS hyperactive.   Extremities: no calf tenderness or swelling    LABS:                  RADIOLOGY & ADDITIONAL STUDIES:    < from: CT Abdomen and Pelvis w/ IV Cont (04.23.24 @ 10:26) >    ACC: [id number] EXAM:  CT ABDOMEN AND PELVIS IC   ORDERED BY: MARYELLEN BENITEZ     PROCEDURE DATE:  04/23/2024          INTERPRETATION:  CLINICAL INFORMATION: Abdominal pain and vomiting status   post hysterectomy 4 days previously    COMPARISON: None.    CONTRAST/COMPLICATIONS:  IV Contrast: Omnipaque 350  90 cc administered   10 cc discarded  Oral Contrast: NONE  Complications: None reported at time of study completion    PROCEDURE:  CT of the Abdomen and Pelvis was performed.  Sagittal and coronal reformats were performed.    FINDINGS:  LOWER CHEST: Nonspecific bilateral breast skin thickening.    LIVER: Indeterminate peripheral 3.6 x 3.0 cm hypoattenuating segment 8/5   hepatic lesion associated with capsular retraction.  BILE DUCTS: Normal caliber.  GALLBLADDER: Within normal limits.  SPLEEN: Within normal limits.  PANCREAS: Within normal limits.  ADRENALS: Within normal limits.  KIDNEYS/URETERS: Within normal limits.    BLADDER: Within normal limits.  REPRODUCTIVE ORGANS: Hysterectomy.    BOWEL: High-grade small bowel obstruction, upper transition point in the   central pelvis (2, 115) where there is incomplete twisting of the   mesentery. Mild upstream small bowel obstruction and complete downstream   collapse.  Appendix not identified.  PERITONEUM: Small volume slightly hyperdense ascites and mesenteric fluid.  VESSELS: Atherosclerotic changes.  RETROPERITONEUM/LYMPH NODES: No lymphadenopathy.  ABDOMINAL WALL: Minimal postoperative changes  BONES: Numerous small lucencies scattered throughout the visualized   osseous structures.    IMPRESSION:  1.  High-grade small bowel obstruction associated with small volume   hyperdense ascites and mesenteric fluid. Consider possibility of   developing ischemia.  2.  Indeterminate 3.6 cm liver lesion, advise MRI with and without   contrast for further assessment.  3.  Numerous small lucencies scattered throughout the visualized osseous   structures. Consider heterogeneous osteopenia, myeloma, or metastatic   disease. Further workup as clinically directed and may include MRI of the   spine and/or pelvis.   Pt is a 60 yo P0  POD4 s/p [facility] BS transfered from [place] ED for high grade SBO. She states she was feeling well post operatively, only requiring tylenol for two days. Today she started to have severe abdominal pain, > 20 episodes of emesis, and one episode of diarrhea. She had a CT performed in the ED showing high grade SBO and a NG tube was placed. She has not had emesis since then.        OB/GYN Hx: G1 - ectopic pregnancy.   PMHx: multiple myeloma, stopped medications 4/4 prior to surgery  SHx: UAE, L/S salpingectomy, cervical vertebral fusion, 4/19/24 [facility] BS  Meds: reglamid  10 mg, daratumumab, ASA 81, benadryl for insomnia  Allergies: NKDA    PHYSICAL EXAM:   Vital Signs Last 24 Hrs  T(C): --  T(F): --  HR: 91 (23 Apr 2024 21:00) (91 - 91)  BP: 160/89 (23 Apr 2024 21:00) (160/89 - 160/89)  BP(mean): 113 (23 Apr 2024 21:00) (113 - 113)  RR: 17 (23 Apr 2024 21:00) (17 - 17)  SpO2: 98% (23 Apr 2024 21:00) (98% - 98%)    Parameters below as of 23 Apr 2024 21:00  Patient On (Oxygen Delivery Method): room air        **************************  Constitutional: Alert & Oriented x3, No acute distress  Respiratory: no increased WOB  Gastrointestinal: soft, non tender, nondistended, no rebound or guarding. Lower abdominal bowel sounds hypoactive, RUQ BS hyperactive.   Extremities: no calf tenderness or swelling    LABS:                  RADIOLOGY & ADDITIONAL STUDIES:    < from: CT Abdomen and Pelvis w/ IV Cont (04.23.24 @ 10:26) >    ACC: [id number] EXAM:  CT ABDOMEN AND PELVIS IC   ORDERED BY: MARYELLEN BENITEZ     PROCEDURE DATE:  04/23/2024          INTERPRETATION:  CLINICAL INFORMATION: Abdominal pain and vomiting status   post hysterectomy 4 days previously    COMPARISON: None.    CONTRAST/COMPLICATIONS:  IV Contrast: Omnipaque 350  90 cc administered   10 cc discarded  Oral Contrast: NONE  Complications: None reported at time of study completion    PROCEDURE:  CT of the Abdomen and Pelvis was performed.  Sagittal and coronal reformats were performed.    FINDINGS:  LOWER CHEST: Nonspecific bilateral breast skin thickening.    LIVER: Indeterminate peripheral 3.6 x 3.0 cm hypoattenuating segment 8/5   hepatic lesion associated with capsular retraction.  BILE DUCTS: Normal caliber.  GALLBLADDER: Within normal limits.  SPLEEN: Within normal limits.  PANCREAS: Within normal limits.  ADRENALS: Within normal limits.  KIDNEYS/URETERS: Within normal limits.    BLADDER: Within normal limits.  REPRODUCTIVE ORGANS: Hysterectomy.    BOWEL: High-grade small bowel obstruction, upper transition point in the   central pelvis (2, 115) where there is incomplete twisting of the   mesentery. Mild upstream small bowel obstruction and complete downstream   collapse.  Appendix not identified.  PERITONEUM: Small volume slightly hyperdense ascites and mesenteric fluid.  VESSELS: Atherosclerotic changes.  RETROPERITONEUM/LYMPH NODES: No lymphadenopathy.  ABDOMINAL WALL: Minimal postoperative changes  BONES: Numerous small lucencies scattered throughout the visualized   osseous structures.    IMPRESSION:  1.  High-grade small bowel obstruction associated with small volume   hyperdense ascites and mesenteric fluid. Consider possibility of   developing ischemia.  2.  Indeterminate 3.6 cm liver lesion, advise MRI with and without   contrast for further assessment.  3.  Numerous small lucencies scattered throughout the visualized osseous   structures. Consider heterogeneous osteopenia, myeloma, or metastatic   disease. Further workup as clinically directed and may include MRI of the   spine and/or pelvis.

## 2024-04-23 NOTE — ED PROVIDER NOTE - PHYSICAL EXAMINATION
Abdomen soft upper abdominal tenderness palpation 3 laparoscopic incisions look close no surrounding erythema no dehiscence.

## 2024-04-23 NOTE — PATIENT PROFILE ADULT - FUNCTIONAL ASSESSMENT - BASIC MOBILITY ASSESSMENT TYPE
Patricia robles has her scheduled 8/23. She had an emergency surgery so she could not see her the prior date but will be getting her in.    Admission

## 2024-04-23 NOTE — ED PROVIDER NOTE - NSRISKOFTRANSFER_ED_A_ED
Writer has reviewed status with Dr. Cheli SUAREZ for patient to be referred to radiation oncology.  Order, referral were previously placed.  Writer has contacted radiation oncology to reach out to patient to schedule.  Patient verbalizes understanding and many thanks.   
Deterioration of Condition En Route/Increased Pain/Transportation Risk (There is always a risk of traffic delays resulting in deterioration of condition.)

## 2024-04-23 NOTE — H&P ADULT - ATTENDING COMMENTS
Gyn attg:     Patient POD#4 presented to ED with nausea and vomiting, episode of diarrhea and abdominal pain. CT scan showed SBO transferred to [facility] site of original surgery for management. Surgery consult called. NPO, NGT, close observation. Plan of care reviewed with patient. I was reviewed finding and communication with patient throughout the day prior to transfer. Once CT finding reported and spoke with surgery team at  arranged for transfer.    Pedro Marin MD

## 2024-04-23 NOTE — H&P ADULT - ATTENDING SUPERVISION STATEMENT
What Is The Reason For Today's Visit?: Excessive sun exposure Additional History: 6 month skin check Resident

## 2024-04-23 NOTE — ED ADULT NURSE NOTE - NSFALLRISKFACTORS_ED_ALL_ED
hysterectomy/Surgery: Recent surgery, recent lower limb amputation, major abdominal or thoracic surgery

## 2024-04-23 NOTE — CONSULT NOTE ADULT - SUBJECTIVE AND OBJECTIVE BOX
HPI:  Pt is a 60 yo P0  POD4 s/p TLH BS now presenting from [facility] for high grade SBO. She states she was feeling well post operatively, only requiring tylenol for two days. Today she started to have severe abdominal pain, > 20 episodes of emesis, and one episode of diarrhea.      OB/GYN Hx: G1 - ectopic pregnancy.   PMHx: multiple myeloma, stopped medications 4/4 prior to surgery  SHx: UAE, L/S salpingectomy, cervical vertebral fusion  Meds: reglamid  10 mg, daratumumab, ASA 81, benadryl for insomnia  Allergies: NKDA    PHYSICAL EXAM:   Vital Signs Last 24 Hrs  T(C): --  T(F): --  HR: 91 (23 Apr 2024 21:00) (91 - 91)  BP: 160/89 (23 Apr 2024 21:00) (160/89 - 160/89)  BP(mean): 113 (23 Apr 2024 21:00) (113 - 113)  RR: 17 (23 Apr 2024 21:00) (17 - 17)  SpO2: 98% (23 Apr 2024 21:00) (98% - 98%)    Parameters below as of 23 Apr 2024 21:00  Patient On (Oxygen Delivery Method): room air        **************************  Constitutional: Alert & Oriented x3, No acute distress  Respiratory: no increased WOB  Gastrointestinal: soft, non tender, nondistended, no rebound or guarding. Lower abdominal bowel sounds hypoactive, RUQ BS hyperactive.   Extremities: no calf tenderness or swelling    LABS:                  RADIOLOGY & ADDITIONAL STUDIES:   (23 Apr 2024 21:44)      SURGERY ADDENDUM  The patient is a 59 year old female with a PMHx of myeloma (on PO and IV chemotherapy) and PSHx of anterior cervical spinal fusion now s/p laparoscopic hysterectomy with extensive EDEL on 4/19/24 now presenting with abdominal pain, nausea/vomiting x 1 day.   General surgery consulted for high grade SBO on CTAP.   The patient states that on POD 3 she began having abdominal discomfort that continued to worsen with multiple episodes of nbnb emesis. The patient was unable to tolerate PO intake including fluids or pain meds, which prompted her going to local ED. She was admitted and NGT placed with 600cc of gastric return. Patient denies chest pain/shortness of breath, fever/chills, calf pain. She states she had one episode of diarrhea prior to admission at OSH, no flatus.     PMHx - myeloma (s/p PO and IV chemotherapy) , fibroids  PSHx - anterior cervical spinal fusion now s/p laparoscopic hysterectomy with extensive EDEL  Meds -  reglamid 10 mg, daratumumab, ASA 81, benadryl for insomnia    PAST MEDICAL & SURGICAL HISTORY:  HLD (hyperlipidemia)      Multiple myeloma      S/P thyroid biopsy      History of ectopic pregnancy      H/O foot surgery  B/L      H/O cervical spine surgery  Titanium Cage C4-C6          MEDICATIONS  (STANDING):  acetaminophen   IVPB .. 1000 milliGRAM(s) IV Intermittent once  lactated ringers. 1000 milliLiter(s) (125 mL/Hr) IV Continuous <Continuous>    MEDICATIONS  (PRN):      Allergies    No Known Allergies    Intolerances        SOCIAL HISTORY:    FAMILY HISTORY:      Vital Signs Last 24 Hrs  T(C): --  T(F): --  HR: 91 (23 Apr 2024 21:00) (91 - 91)  BP: 160/89 (23 Apr 2024 21:00) (160/89 - 160/89)  BP(mean): 113 (23 Apr 2024 21:00) (113 - 113)  RR: 17 (23 Apr 2024 21:00) (17 - 17)  SpO2: 98% (23 Apr 2024 21:00) (98% - 98%)    Parameters below as of 23 Apr 2024 21:00  Patient On (Oxygen Delivery Method): room air        PHYSICAL EXAM:   General: Patient is doing well and lying in bed comfortably  Constitutional: alert and oriented   Pulm: Nonlabored breathing, no respiratory distress; NGT in place  CV: Regular rate and rhythm, normal sinus rhythm  Abd: soft, minimally tender, nondistended. No rebound, no guarding  Extremities: warm, well perfused, no edema    LABS:                RADIOLOGY & ADDITIONAL STUDIES:       HPI:  Pt is a 58 yo P0  POD4 s/p TLH BS now presenting from [facility] ED for high grade SBO. She states she was feeling well post operatively, only requiring tylenol for two days. Today she started to have severe abdominal pain, > 20 episodes of emesis, and one episode of diarrhea.      OB/GYN Hx: G1 - ectopic pregnancy.   PMHx: multiple myeloma, stopped medications 4/4 prior to surgery  SHx: UAE, L/S salpingectomy, cervical vertebral fusion  Meds: reglamid  10 mg, daratumumab, ASA 81, benadryl for insomnia  Allergies: NKDA    PHYSICAL EXAM:   Vital Signs Last 24 Hrs  T(C): --  T(F): --  HR: 91 (23 Apr 2024 21:00) (91 - 91)  BP: 160/89 (23 Apr 2024 21:00) (160/89 - 160/89)  BP(mean): 113 (23 Apr 2024 21:00) (113 - 113)  RR: 17 (23 Apr 2024 21:00) (17 - 17)  SpO2: 98% (23 Apr 2024 21:00) (98% - 98%)    Parameters below as of 23 Apr 2024 21:00  Patient On (Oxygen Delivery Method): room air        **************************  Constitutional: Alert & Oriented x3, No acute distress  Respiratory: no increased WOB  Gastrointestinal: soft, non tender, nondistended, no rebound or guarding. Lower abdominal bowel sounds hypoactive, RUQ BS hyperactive.   Extremities: no calf tenderness or swelling    LABS:                  RADIOLOGY & ADDITIONAL STUDIES:   (23 Apr 2024 21:44)      SURGERY ADDENDUM  The patient is a 59 year old female with a PMHx of myeloma (on PO and IV chemotherapy) and PSHx of anterior cervical spinal fusion now s/p laparoscopic hysterectomy with extensive EDEL on 4/19/24 now presenting with abdominal pain, nausea/vomiting x 1 day.   General surgery consulted for high grade SBO on CTAP.   The patient states that on POD 3 she began having abdominal discomfort that continued to worsen with multiple episodes of nbnb emesis. The patient was unable to tolerate PO intake including fluids or pain meds, which prompted her going to local ED. She was admitted and NGT placed with 600cc of gastric return. Patient denies chest pain/shortness of breath, fever/chills, calf pain. She states she had one episode of diarrhea prior to admission at OSH, no flatus.     PMHx - myeloma (s/p PO and IV chemotherapy) , fibroids  PSHx - anterior cervical spinal fusion now s/p laparoscopic hysterectomy with extensive EDEL  Meds -  reglamid 10 mg, daratumumab, ASA 81, benadryl for insomnia    PAST MEDICAL & SURGICAL HISTORY:  HLD (hyperlipidemia)      Multiple myeloma      S/P thyroid biopsy      History of ectopic pregnancy      H/O foot surgery  B/L      H/O cervical spine surgery  Titanium Cage C4-C6          MEDICATIONS  (STANDING):  acetaminophen   IVPB .. 1000 milliGRAM(s) IV Intermittent once  lactated ringers. 1000 milliLiter(s) (125 mL/Hr) IV Continuous <Continuous>    MEDICATIONS  (PRN):      Allergies    No Known Allergies    Intolerances        SOCIAL HISTORY:    FAMILY HISTORY:      Vital Signs Last 24 Hrs  T(C): --  T(F): --  HR: 91 (23 Apr 2024 21:00) (91 - 91)  BP: 160/89 (23 Apr 2024 21:00) (160/89 - 160/89)  BP(mean): 113 (23 Apr 2024 21:00) (113 - 113)  RR: 17 (23 Apr 2024 21:00) (17 - 17)  SpO2: 98% (23 Apr 2024 21:00) (98% - 98%)    Parameters below as of 23 Apr 2024 21:00  Patient On (Oxygen Delivery Method): room air        PHYSICAL EXAM:   General: Patient is doing well and lying in bed comfortably  Constitutional: alert and oriented   Pulm: Nonlabored breathing, no respiratory distress; NGT in place  CV: Regular rate and rhythm, normal sinus rhythm  Abd: soft, minimally tender, primarily around incisions, softly distended. No rebound, no guarding  Incisions: c/d/i, no erythema or drainage  Extremities: warm, well perfused, no edema    LABS:                RADIOLOGY & ADDITIONAL STUDIES:

## 2024-04-23 NOTE — ED PROVIDER NOTE - OBJECTIVE STATEMENT
59-year-old female history of hysterectomy 4 days ago presents with upper abdominal pain vomiting and diarrhea since yesterday.  Patient states after that laparoscopic hysterectomy she was feeling fine with no pain and only taking Tylenol as needed.  Denies any urinary symptoms states she has been urinating normally.

## 2024-04-23 NOTE — CONSULT NOTE ADULT - SUBJECTIVE AND OBJECTIVE BOX
GENERAL SURGERY CONSULT  59-year-old female with past medical history of myeloma (currently on PO and IV chemotherapy) and past surgical history of cervical spine surgery w/ anterior approach and is now s/p hysterectomy POD#4 presents to the ED with upper abdominal pain associated with vomiting and diarrhea since 2am. General surgery consulted for CT findings of high-grade SBO. Pt seen and examined at bedside. Patient states after elective laparoscopic hysterectomy 2/2 pelvic pain/fibroids she was feeling fine with no pain and only taking Tylenol as needed. On POD#3 pt began having abdominal discomfort which continually worsened with multiple episodes of NBNB vomiting. Pt unable to tolerate PO fluids / medicine which prompted ED visit. Denies severe abd pain, constipation. Pt denies prior abdominal surgeries or instance of SBO. Pt denies known abdominal masses, continually follows with oncologist Dr. Dodge ([phone]).     PAST MEDICAL & SURGICAL HISTORY:      MEDICATIONS  (STANDING):    MEDICATIONS  (PRN):      Allergies    No Known Allergies    Intolerances        Vital Signs Last 24 Hrs  T(C): 37.3 (2024 13:06), Max: 37.3 (2024 13:06)  T(F): 99.2 (2024 13:06), Max: 99.2 (2024 13:06)  HR: 87 (2024 13:06) (80 - 87)  BP: 125/72 (2024 13:06) (125/72 - 134/89)  BP(mean): --  RR: 18 (2024 13:06) (16 - 18)  SpO2: 99% (2024 13:06) (98% - 99%)    Parameters below as of 2024 07:34  Patient On (Oxygen Delivery Method): room air        Physical:  Gen: A&Ox3. NAD. Nontoxic appearing.   Head/Neck: NGT in place to LCS. 600cc immediate return of gastric contents.   Resp: Unlabored breathing. Equal chest rise bilaterally.   Abd: Soft ND, tenderness to palpation in RLQ/mid lower abdomen. Remaining abdomen nontender. Mild ecchymosis noted around lap pelvic incisions- normal postop appearance. Incisions are clean, dry. skin well approximated, no active drainage. No voluntary guarding, no rebound tenderness, no palpable masses/hernias.         I&O's Detail      LABS:                        13.9   6.56  )-----------( 185      ( 2024 08:47 )             43.0                  139  |  109<H>  |  11  ----------------------------<  120<H>  5.3   |  26  |  0.95    Ca    9.9      2024 08:47    TPro  6.8  /  Alb  3.5  /  TBili  0.6  /  DBili  x   /  AST  36  /  ALT  32  /  AlkPhos  78                Urinalysis Basic - ( 2024 08:47 )    Color: Yellow / Appearance: Clear / S.021 / pH: x  Gluc: 120 mg/dL / Ketone: Trace mg/dL  / Bili: Negative / Urobili: 0.2 mg/dL   Blood: x / Protein: Negative mg/dL / Nitrite: Negative   Leuk Esterase: Trace / RBC: 3 /HPF / WBC 3 /HPF   Sq Epi: x / Non Sq Epi: x / Bacteria: Few /HPF        RADIOLOGY & ADDITIONAL STUDIES:  < from: CT Abdomen and Pelvis w/ IV Cont (24 @ 10:26) >    ACC: [id number] EXAM:  CT ABDOMEN AND PELVIS IC   ORDERED BY: MARYELLEN BENITEZ     PROCEDURE DATE:  2024          INTERPRETATION:  CLINICAL INFORMATION: Abdominal pain and vomiting status   post hysterectomy 4 days previously    COMPARISON: None.    CONTRAST/COMPLICATIONS:  IV Contrast: Omnipaque 350  90 cc administered   10 cc discarded  Oral Contrast: NONE  Complications: None reported at time of study completion    PROCEDURE:  CT of the Abdomen and Pelvis was performed.  Sagittal and coronal reformats were performed.    FINDINGS:  LOWER CHEST: Nonspecific bilateral breast skin thickening.    LIVER: Indeterminate peripheral 3.6 x 3.0 cm hypoattenuating segment 8/5   hepatic lesion associated with capsular retraction.  BILE DUCTS: Normal caliber.  GALLBLADDER: Within normal limits.  SPLEEN: Within normal limits.  PANCREAS: Within normal limits.  ADRENALS: Within normal limits.  KIDNEYS/URETERS: Within normal limits.    BLADDER: Within normal limits.  REPRODUCTIVE ORGANS: Hysterectomy.    BOWEL: High-grade small bowel obstruction, upper transition point in the   central pelvis (2, 115) where there is incomplete twisting of the   mesentery. Mild upstream small bowel obstruction and complete downstream   collapse.  Appendix not identified.  PERITONEUM: Small volume slightly hyperdense ascites and mesenteric fluid.  VESSELS: Atherosclerotic changes.  RETROPERITONEUM/LYMPH NODES: No lymphadenopathy.  ABDOMINAL WALL: Minimal postoperative changes  BONES: Numerous small lucencies scattered throughout the visualized   osseous structures.    IMPRESSION:  1.  High-grade small bowel obstruction associated with small volume   hyperdense ascites and mesenteric fluid. Consider possibility of   developing ischemia.  2.  Indeterminate 3.6 cm liver lesion, advise MRI with and without   contrast for further assessment.  3.  Numerous small lucencies scattered throughout the visualized osseous   structures. Consider heterogeneous osteopenia, myeloma, or metastatic   disease. Further workup as clinically directed and may include MRI of the   spine and/or pelvis.        --- End of Report ---    < end of copied text >  
58 yo F presents to the ED for nausea, vomiting and abdominal pain x 1 day. Patient is POD#4 s/p total laparoscopic hysterectomy/bilateral salpingectomy at [facility] by Dr. Marin. Patient reports she woke up at 2 am this morning and was feeling nauseous and began having vomiting episodes. Reports 1 episode of diarrhea. Reports band like epigastric pain across upper abdomen. Denies pelvic pain or pain at the incision sites. Denies vaginal bleeding, vaginal discharge, fever, chills, chest pain, shortness of breath, dizziness, palpitations, or any other complaints     pob/gynhx: TLH/BS done by Dr. Marin at [facility] on , patient notified Dr. Marin this morning that she was coming to the ED   H/o ectopic pregnancy with laparotomy   Pt denies std's, abnormal pap smear, fibroids or ovarian cysts  pmhx: Multiple myeloma  pshx:   24 TLH/BS for fibroid removal   laparotomy for ectopic pregnancy   allergies: nka    REVIEW OF SYSTEMS: see HPI	    PE:  Vital Signs Last 24 Hrs  T(C): 37.3 (2024 13:06), Max: 37.3 (2024 13:06)  T(F): 99.2 (2024 13:06), Max: 99.2 (2024 13:06)  HR: 87 (2024 13:06) (80 - 87)  BP: 125/72 (2024 13:06) (125/72 - 134/89)  RR: 18 (2024 13:06) (16 - 18)  SpO2: 99% (2024 13:06) (98% - 99%)  Parameters below as of 2024 07:34  Patient On (Oxygen Delivery Method): room air      Gen: resting in bed with NG tube in place, A&Ox3, NAD  Pulm: saturating well on room air   abd: NG tube in place draining to wall suction; soft, nontender, nondistended, no rebound or guarding, incision sites intact without erythema, edema or drainage, no evidence of hernia or herniated bowel at incision sites  Extremities: no edema, nontender       LABS:                        13.9   6.56  )-----------( 185      ( 2024 08:47 )             43.0         139  |  109<H>  |  11  ----------------------------<  120<H>  5.3   |  26  |  0.95    Ca    9.9      2024 08:47    TPro  6.8  /  Alb  3.5  /  TBili  0.6  /  DBili  x   /  AST  36  /  ALT  32  /  AlkPhos  78        Urinalysis Basic - ( 2024 08:47 )    Color: Yellow / Appearance: Clear / S.021 / pH: x  Gluc: 120 mg/dL / Ketone: Trace mg/dL  / Bili: Negative / Urobili: 0.2 mg/dL   Blood: x / Protein: Negative mg/dL / Nitrite: Negative   Leuk Esterase: Trace / RBC: 3 /HPF / WBC 3 /HPF   Sq Epi: x / Non Sq Epi: x / Bacteria: Few /HPF    < from: CT Abdomen and Pelvis w/ IV Cont (24 @ 10:26) >    ACC: [id number] EXAM:  CT ABDOMEN AND PELVIS IC   ORDERED BY: MARYELLEN BENITEZ     PROCEDURE DATE:  2024          INTERPRETATION:  CLINICAL INFORMATION: Abdominal pain and vomiting status   post hysterectomy 4 days previously    COMPARISON: None.    CONTRAST/COMPLICATIONS:  IV Contrast: Omnipaque 350  90 cc administered   10 cc discarded  Oral Contrast: NONE  Complications: None reported at time of study completion    PROCEDURE:  CT of the Abdomen and Pelvis was performed.  Sagittal and coronal reformats were performed.    FINDINGS:  LOWER CHEST: Nonspecific bilateral breast skin thickening.    LIVER: Indeterminate peripheral 3.6 x 3.0 cm hypoattenuating segment 8/5   hepatic lesion associated with capsular retraction.  BILE DUCTS: Normal caliber.  GALLBLADDER: Within normal limits.  SPLEEN: Within normal limits.  PANCREAS: Within normal limits.  ADRENALS: Within normal limits.  KIDNEYS/URETERS: Within normal limits.    BLADDER: Within normal limits.  REPRODUCTIVE ORGANS: Hysterectomy.    BOWEL: High-grade small bowel obstruction, upper transition point in the   central pelvis (2, 115) where there is incomplete twisting of the   mesentery. Mild upstream small bowel obstruction and complete downstream   collapse.  Appendix not identified.  PERITONEUM: Small volume slightly hyperdense ascites and mesenteric fluid.  VESSELS: Atherosclerotic changes.  RETROPERITONEUM/LYMPH NODES: No lymphadenopathy.  ABDOMINAL WALL: Minimal postoperative changes  BONES: Numerous small lucencies scattered throughout the visualized   osseous structures.    IMPRESSION:  1.  High-grade small bowel obstruction associated with small volume   hyperdense ascites and mesenteric fluid. Consider possibility of   developing ischemia.  2.  Indeterminate 3.6 cm liver lesion, advise MRI with and without   contrast for further assessment.  3.  Numerous small lucencies scattered throughout the visualized osseous   structures. Consider heterogeneous osteopenia, myeloma, or metastatic   disease. Further workup as clinically directed and may include MRI of the   spine and/or pelvis.        --- End of Report ---      < end of copied text >

## 2024-04-23 NOTE — CONSULT NOTE ADULT - ASSESSMENT
The patient is a 59 year old female with a PMHx of myeloma (on PO and IV chemotherapy) and PSHx of anterior cervical spinal fusion now s/p laparoscopic hysterectomy with extensive EDEL on 4/19/24 now presenting with abdominal pain, nausea/vomiting x 1 day.   General surgery consulted for high grade SBO on CTAP.     Recommendations  No acute surgical intervention indicated at this time  NPO/IVF  NGT to LIWS, CXR to confirm placement  Pain/nausea control prn - avoid narcotics  MELLY  SQH/SCDs/OOBA/IS  Rest of care per primary  Plan discussed with attending and chief resident.   ____________________________________________________

## 2024-04-23 NOTE — PATIENT PROFILE ADULT - FALL HARM RISK - HARM RISK INTERVENTIONS

## 2024-04-23 NOTE — CONSULT NOTE ADULT - ASSESSMENT
58 y/o female s/p lap hysterectomy, POD#4 with high-grade SBO  VSS, afebrile, no leukocytosis (mild left shift)    NGT placed at bedside with 600cc gastric content return. Called Dr. Marin ([phone]) and discussed pt presentation/CT scan. Dr. Marin prefers to treat patient at [facility] where she has privileges.    Plan   - tx to [facility], ED to ED transfer  - continue NGT to [facility], monitor output   - serial abd exams  - NPO + IVF  - IV pain / nausea control prn   - discussed with Dr. Hoff

## 2024-04-23 NOTE — ED PROVIDER NOTE - PROGRESS NOTE DETAILS
high grade SBO. GYN Dr Mcdonald  at [facility] contacted me requested to speak with surgical team. information provided

## 2024-04-23 NOTE — ED PROVIDER NOTE - CLINICAL SUMMARY MEDICAL DECISION MAKING FREE TEXT BOX
Patient with vomiting diarrhea abdominal pain in setting of hysterectomy 4 days ago.  Will do fluids labs re CT scan pain control reassess.  Differential includes gastroenteritis versus bowel obstruction versus internal bleeding versus colitis

## 2024-04-23 NOTE — CONSULT NOTE ADULT - NSCONSULTADDITIONALINFOA_GEN_ALL_CORE
****Senior Surgical Resident Addendum****    60yo F, POD 4 s/p TL BS who was transferred from  ER w high grade SBO, for which general surgery was consulted. Pt discharged same day of surgery, initial post op course uncomplicated. Early this AM developed severe abd pain in band like fashion across abdomen, w persistent emesis and one episode of diarrhea, prompting ER visit. HD w/nl. WBC 6.5 w PMN left shift. CT A/P w high grade SBO 2/2 TP in pelvis. Upon arrival to [facility], ROS negative. HD w/nl. Abd softly distended, mild isaias-incisional TTP w/o R/G. NGT to LIWS w drainage of gastric content. No acute surgical intervention warranted at this time, NPO/IVF, NGT to LIWS, q6hr MELLY. 4C to continue to follow.

## 2024-04-23 NOTE — PATIENT PROFILE ADULT - NSPROPOAURINARYCATHETER_GEN_A_NUR
ED HPI GENERAL MEDICAL PROBLEM





- General


Chief Complaint: Abdominal Pain


Stated Complaint: ABD PAIN


Time Seen by Provider: 05/19/18 08:27


Source of Information: Reports: Patient


History Limitations: Reports: No Limitations





- History of Present Illness


INITIAL COMMENTS - FREE TEXT/NARRATIVE: 


HISTORY AND PHYSICAL:





History of present illness:


44-year-old male presenting to emergency department chief complaint of 

epigastric abdominal pain 2 days with past medical history of chronic 

pancreatitis, and familial hyperlipidemia/hypertriglyceridemia.





Patient states he's been working here for 1 month and is originally from 

[place]. Approximately 2 days ago he began to have some epigastric abdominal 

pain which has progressively gotten worse. It is located epigastric 

nonradiating and has had no associated diarrhea, bloody stool, or dark tarry 

stools. He has some mild nausea without vomiting. Patient states he has a 

history of chronic pancreatitis secondary to his familial hyperlipidemia. He 

has been hospitalized several times for this. States that his symptoms are 

similar to previous episodes. He does not see a provider here in [place]. He 

denies any fever, chills, malaise, sore throat, cough, shortness of breath, 

chest pain, palpitations, syncopal episodes, or focal neurologic deficits.








Review of systems: 


As per history of present illness and below otherwise all systems reviewed and 

negative.





Past medical history: 


As per history of present illness and as reviewed below otherwise 

noncontributory.





Surgical history: 


As per history of present illness and as reviewed below otherwise 

noncontributory.





Social history: 


No reported history of drug or alcohol abuse.





Family history: 


As per history of present illness and as reviewed below otherwise 

noncontributory.





Physical exam:


HEENT: Atraumatic, normocephalic, pupils reactive, negative for conjunctival 

pallor or scleral icterus, mucous membranes moist, throat clear, neck supple, 

nontender, trachea midline.


Lungs: Clear to auscultation, breath sounds equal bilaterally, chest nontender.


Heart: S1S2, regular, negative for clicks, rubs, or JVD.


Abdomen: Soft, nondistended, tender to deep palpation epigastric area. Negative 

for masses or hepatosplenomegaly. Negative for costovertebral tenderness.


Pelvis: Stable nontender.


Genitourinary: Deferred.


Rectal: Deferred.


Extremities: Atraumatic, negative for cords or calf pain. Neurovascular 

unremarkable.


Neuro: Awake, alert, oriented. Cranial nerves II through XII unremarkable. 

Cerebellum unremarkable. Motor and sensory unremarkable throughout. Exam 

nonfocal.





Diagnostics:


CBC, CMP, amylase, lipase, lipid panel, CT abdomen pelvis





Therapeutics:


IV normal saline 1 L 2, Dilaudid 0.5 mg IV 1, Zofran 8 mg IV 1





Impression: 


Abdominal pain


Hypertriglyceridemia


Familial hyperlipidemia/hypertriglyceridemia





Plan:


CBC, CMP, amylase, and lipase were unremarkable. Lipid panel did reveal a 

triglyceride level at 2585 with a total cholesterol 364 VLDL 517 and HDL of 30. 

Patient states he takes Lopid 600 mg twice a day. States that sometimes he 

forgets to take it twice a day secondary to work. He has had multiple episodes 

of pancreatitis secondary to hypertriglyceridemia in the past. CT of the 

abdomen today revealed no evidence of pancreatic inflammation, ductal dilitation

, or mass suggestive of pancreatitis. There were no other sources on CT for 

abdominal pain. This was explained to the patient and he stated that often he 

starts to get the pain when his triglyceride levels are high even before he 

develops pancreatitis. Did instruct him to continue drinking lots of fluids and 

prescribed him atorvastatin 80 mg by mouth daily to help with his elevated 

triglycerides as well as cholesterol. We also scheduled him to follow up with 

the residency clinic here in [place] early next week. I did give him a 

prescription for atorvastatin 80 mg by mouth daily 10 and Norco 5-325 mg by 

mouth every 6 hours #12. Patient was discharged in good condition with 

instructions return to emergency department if he had any new or worsening 

symptoms.a





Definitive disposition and diagnosis as appropriate pending reevaluation and 

review of above.








  ** Left Upper Abdominal


Pain Score (Numeric/FACES): 10





- Related Data


 Allergies











Allergy/AdvReac Type Severity Reaction Status Date / Time


 


No Known Allergies Allergy   Verified 05/19/18 08:22











Home Meds: 


 Home Meds





Gemfibrozil [Lopid] 600 mg PO BID 05/19/18 [History]











Past Medical History


Cardiovascular History: Reports: High Cholesterol


Other Cardiovascular History: high triglycerides


Gastrointestinal History: Reports: Pancreatitis


Other Gastrointestinal History: pancreatitis x9; has a familial hyperlipidemia 

which he states it is linked to the pancreatitis





- Infectious Disease History


Infectious Disease History: Reports: Chicken Pox





- Past Surgical History


GI Surgical History: Reports: Cholecystectomy





Social & Family History





- Family History


Family Medical History: Noncontributory





- Tobacco Use


Smoking Status *Q: Never Smoker





- Caffeine Use


Caffeine Use: Reports: Coffee





- Recreational Drug Use


Recreational Drug Use: No





ED ROS GENERAL





- Review of Systems


Review Of Systems: See Below





ED EXAM, GENERAL





- Physical Exam


Exam: See Below





Course





- Vital Signs


Last Recorded V/S: 


 Last Vital Signs











Temp  97.4 F   05/19/18 10:30


 


Pulse  62   05/19/18 10:30


 


Resp  20   05/19/18 10:30


 


BP  154/87 H  05/19/18 10:30


 


Pulse Ox  94 L  05/19/18 10:30














- Orders/Labs/Meds


Orders: 


 Active Orders 24 hr











 Category Date Time Status


 


 Abdomen Pelvis w Cont [CT] Stat Exams  05/19/18 08:30 Taken











Labs: 


 Laboratory Tests











  05/19/18 05/19/18 Range/Units





  08:30 08:30 


 


WBC  4.93   (4.0-11.0)  K/uL


 


RBC  4.54   (4.50-5.90)  M/uL


 


Hgb  14.6   (13.0-17.0)  g/dL


 


Hct  40.3   (38.0-50.0)  %


 


MCV  88.8   (80.0-98.0)  fL


 


MCH  32.2 H   (27.0-32.0)  pg


 


MCHC  36.2   (31.0-37.0)  g/dL


 


RDW Std Deviation  46.5   (28.0-62.0)  fl


 


RDW Coeff of Dajuan  14   (11.0-15.0)  %


 


Plt Count  211   (150-400)  K/uL


 


MPV  10.40   (7.40-12.00)  fL


 


Neut % (Auto)  60.8   (48.0-80.0)  %


 


Lymph % (Auto)  34.5   (16.0-40.0)  %


 


Mono % (Auto)  0.0   (0.0-15.0)  %


 


Eos % (Auto)  4.5   (0.0-7.0)  %


 


Baso % (Auto)  0.2   (0.0-1.5)  %


 


Neut # (Auto)  3.0   (1.4-5.7)  K/uL


 


Lymph # (Auto)  1.7   (0.6-2.4)  K/uL


 


Mono # (Auto)  0.0   (0.0-0.8)  K/uL


 


Eos # (Auto)  0.2   (0.0-0.7)  K/uL


 


Baso # (Auto)  0.0   (0.0-0.1)  K/uL


 


Nucleated RBC %  0.0   /100WBC


 


Nucleated RBCs #  0   K/uL


 


Sodium   139  (136-148)  mmol/L


 


Potassium   4.1  (3.5-5.1)  mmol/L


 


Chloride   105  ()  mmol/L


 


Carbon Dioxide   26.1  (21.0-32.0)  mmol/L


 


BUN   21 H  (7.0-18.0)  mg/dL


 


Creatinine   1.0  (0.8-1.3)  mg/dL


 


Est Cr Clr Drug Dosing   85.07  mL/min


 


Estimated GFR (MDRD)   > 60.0  ml/min


 


Glucose   110 H  ()  mg/dL


 


Calcium   8.3 L  (8.5-10.1)  mg/dL


 


Total Bilirubin   0.4  (0.2-1.0)  mg/dL


 


AST   33  (15-37)  IU/L


 


ALT   33  (14-63)  IU/L


 


Alkaline Phosphatase   77  ()  U/L


 


Total Protein   7.1  (6.4-8.2)  g/dL


 


Albumin   3.9  (3.4-5.0)  g/dL


 


Globulin   3.2  (2.0-3.5)  g/dL


 


Albumin/Globulin Ratio   1.2 L  (1.3-2.8)  


 


Triglycerides   2585 H  (0-200)  mg/dL


 


Cholesterol   364 H  ()  mg/dL


 


VLDL Cholesterol   517 H  (5-55)  mg/dL


 


HDL Cholesterol   30 L  (40-60)  mg/dL


 


Cholesterol/HDL Ratio   12.1 H  (3.3-6.0)  


 


Amylase   31  ()  U/L


 


Lipase   158  ()  U/L











Meds: 


Medications














Discontinued Medications














Generic Name Dose Route Start Last Admin





  Trade Name Freq  PRN Reason Stop Dose Admin


 


Hydromorphone HCl  0.5 mg  05/19/18 08:35  05/19/18 09:25





  Dilaudid  IVPUSH  05/19/18 08:36  Not Given





  ONETIME ONE   





     





     





     





     


 


Hydromorphone HCl  0.5 mg  05/19/18 09:30  05/19/18 09:25





  Dilaudid  IVPUSH  05/19/18 09:31  0.5 mg





  ONETIME ONE   Administration





     





     





     





     


 


Lactated Ringer's  1,000 mls @ 999 mls/hr  05/19/18 10:35  





  Ringers, Lactated  IV  05/19/18 11:35  





  .BOLUS ONE   





     





     





     





     


 


Iopamidol  100 ml  05/19/18 10:52  05/19/18 10:53





  Isovue Multipack-370 (76%)  IVPUSH  05/19/18 10:53  100 ml





  ONETIME STA   Administration





     





     





     





     


 


Ondansetron HCl  4 mg  05/19/18 08:36  05/19/18 09:19





  Zofran  IVPUSH  05/19/18 08:37  4 mg





  ONETIME ONE   Administration





     





     





     





     














Departure





- Departure


Time of Disposition: 13:09


Disposition: Home, Self-Care 01


Condition: Good


Clinical Impression: 


 Hypertriglyceridemia, familial








- Discharge Information


Referrals: 


PCP,None [Primary Care Provider] - 


Forms:  ED Department Discharge


Additional Instructions: 


My general discharge


The following information is given to patients seen in the emergency department 

who are being discharged to home. This information is to outline your options 

for follow-up care. We provide all patients seen in our emergency department 

with a follow-up referral.





The need for follow-up, as well as the timing and circumstances, are variable 

depending upon the specifics of your emergency department visit.





If you don't have a primary care physician on staff, we will provide you with a 

referral. We always advise you to contact your personal physician following an 

emergency department visit to inform them of the circumstance of the visit and 

for follow-up with them and/or the need for any referrals to a consulting 

specialist.





The emergency department will also refer you to a specialist when appropriate. 

This referral assures that you have the opportunity for follow-up care with a 

specialist. All of these measure are taken in an effort to provide you with 

optimal care, which includes your follow-up.





Under all circumstances we always encourage you to contact your private 

physician who remains a resource for coordinating your care. When calling for 

follow-up care, please make the office aware that this follow-up is from your 

recent emergency room visit. If for any reason you are refused follow-up, 

please contact the [facility] Emergency 

Department at [phone] and asked to speak to the emergency department 

charge nurse.





[facility]


Primary Care


[address] [place] [address]


Phone: [phone]


Fax: [phone]





[facility]


[address] [place] [address]


Phone: [phone]


Fax: [phone]








- My Orders


Last 24 Hours: 


My Active Orders





05/19/18 08:30


Abdomen Pelvis w Cont [CT] Stat 














- Assessment/Plan


Last 24 Hours: 


My Active Orders





05/19/18 08:30


Abdomen Pelvis w Cont [CT] Stat no

## 2024-04-23 NOTE — ED ADULT NURSE NOTE - OBJECTIVE STATEMENT
Pt S/P total hysterectomy 4/19. Pt c/o epigastric pain associated with nausea/vomiting starting 2am today. Pt describes pain "feels like im being cut open". No acute distress noted. Awaiting to be seen by Provider.

## 2024-04-24 LAB
ANION GAP SERPL CALC-SCNC: 10 MMOL/L — SIGNIFICANT CHANGE UP (ref 5–17)
BASOPHILS # BLD AUTO: 0.03 K/UL — SIGNIFICANT CHANGE UP (ref 0–0.2)
BASOPHILS NFR BLD AUTO: 0.4 % — SIGNIFICANT CHANGE UP (ref 0–2)
BUN SERPL-MCNC: 11 MG/DL — SIGNIFICANT CHANGE UP (ref 7–23)
CALCIUM SERPL-MCNC: 9.6 MG/DL — SIGNIFICANT CHANGE UP (ref 8.4–10.5)
CHLORIDE SERPL-SCNC: 107 MMOL/L — SIGNIFICANT CHANGE UP (ref 96–108)
CO2 SERPL-SCNC: 26 MMOL/L — SIGNIFICANT CHANGE UP (ref 22–31)
CREAT SERPL-MCNC: 0.94 MG/DL — SIGNIFICANT CHANGE UP (ref 0.5–1.3)
EGFR: 70 ML/MIN/1.73M2 — SIGNIFICANT CHANGE UP
EOSINOPHIL # BLD AUTO: 0.07 K/UL — SIGNIFICANT CHANGE UP (ref 0–0.5)
EOSINOPHIL NFR BLD AUTO: 1 % — SIGNIFICANT CHANGE UP (ref 0–6)
GLUCOSE SERPL-MCNC: 91 MG/DL — SIGNIFICANT CHANGE UP (ref 70–99)
HCT VFR BLD CALC: 39.6 % — SIGNIFICANT CHANGE UP (ref 34.5–45)
HGB BLD-MCNC: 12.6 G/DL — SIGNIFICANT CHANGE UP (ref 11.5–15.5)
IMM GRANULOCYTES NFR BLD AUTO: 0.1 % — SIGNIFICANT CHANGE UP (ref 0–0.9)
LYMPHOCYTES # BLD AUTO: 2.1 K/UL — SIGNIFICANT CHANGE UP (ref 1–3.3)
LYMPHOCYTES # BLD AUTO: 28.9 % — SIGNIFICANT CHANGE UP (ref 13–44)
MAGNESIUM SERPL-MCNC: 2.1 MG/DL — SIGNIFICANT CHANGE UP (ref 1.6–2.6)
MCHC RBC-ENTMCNC: 31 PG — SIGNIFICANT CHANGE UP (ref 27–34)
MCHC RBC-ENTMCNC: 31.8 GM/DL — LOW (ref 32–36)
MCV RBC AUTO: 97.3 FL — SIGNIFICANT CHANGE UP (ref 80–100)
MONOCYTES # BLD AUTO: 0.56 K/UL — SIGNIFICANT CHANGE UP (ref 0–0.9)
MONOCYTES NFR BLD AUTO: 7.7 % — SIGNIFICANT CHANGE UP (ref 2–14)
NEUTROPHILS # BLD AUTO: 4.5 K/UL — SIGNIFICANT CHANGE UP (ref 1.8–7.4)
NEUTROPHILS NFR BLD AUTO: 61.9 % — SIGNIFICANT CHANGE UP (ref 43–77)
NRBC # BLD: 0 /100 WBCS — SIGNIFICANT CHANGE UP (ref 0–0)
PHOSPHATE SERPL-MCNC: 2.6 MG/DL — SIGNIFICANT CHANGE UP (ref 2.5–4.5)
PLATELET # BLD AUTO: 174 K/UL — SIGNIFICANT CHANGE UP (ref 150–400)
POTASSIUM SERPL-MCNC: 4.5 MMOL/L — SIGNIFICANT CHANGE UP (ref 3.5–5.3)
POTASSIUM SERPL-SCNC: 4.5 MMOL/L — SIGNIFICANT CHANGE UP (ref 3.5–5.3)
RBC # BLD: 4.07 M/UL — SIGNIFICANT CHANGE UP (ref 3.8–5.2)
RBC # FLD: 15.1 % — HIGH (ref 10.3–14.5)
SODIUM SERPL-SCNC: 143 MMOL/L — SIGNIFICANT CHANGE UP (ref 135–145)
WBC # BLD: 7.27 K/UL — SIGNIFICANT CHANGE UP (ref 3.8–10.5)
WBC # FLD AUTO: 7.27 K/UL — SIGNIFICANT CHANGE UP (ref 3.8–10.5)

## 2024-04-24 PROCEDURE — 81001 URINALYSIS AUTO W/SCOPE: CPT

## 2024-04-24 PROCEDURE — 96376 TX/PRO/DX INJ SAME DRUG ADON: CPT

## 2024-04-24 PROCEDURE — 99231 SBSQ HOSP IP/OBS SF/LOW 25: CPT | Mod: 24

## 2024-04-24 PROCEDURE — 96361 HYDRATE IV INFUSION ADD-ON: CPT

## 2024-04-24 PROCEDURE — 99285 EMERGENCY DEPT VISIT HI MDM: CPT | Mod: 25

## 2024-04-24 PROCEDURE — 96374 THER/PROPH/DIAG INJ IV PUSH: CPT | Mod: XU

## 2024-04-24 PROCEDURE — 36415 COLL VENOUS BLD VENIPUNCTURE: CPT

## 2024-04-24 PROCEDURE — 74177 CT ABD & PELVIS W/CONTRAST: CPT | Mod: MC

## 2024-04-24 PROCEDURE — 99232 SBSQ HOSP IP/OBS MODERATE 35: CPT

## 2024-04-24 PROCEDURE — 85025 COMPLETE CBC W/AUTO DIFF WBC: CPT

## 2024-04-24 PROCEDURE — 80053 COMPREHEN METABOLIC PANEL: CPT

## 2024-04-24 PROCEDURE — 96375 TX/PRO/DX INJ NEW DRUG ADDON: CPT

## 2024-04-24 PROCEDURE — 83690 ASSAY OF LIPASE: CPT

## 2024-04-24 RX ORDER — BENZOCAINE 10 %
1 GEL (GRAM) MUCOUS MEMBRANE EVERY 6 HOURS
Refills: 0 | Status: DISCONTINUED | OUTPATIENT
Start: 2024-04-24 | End: 2024-04-26

## 2024-04-24 RX ORDER — POTASSIUM CHLORIDE 20 MEQ
20 PACKET (EA) ORAL ONCE
Refills: 0 | Status: COMPLETED | OUTPATIENT
Start: 2024-04-24 | End: 2024-04-24

## 2024-04-24 RX ORDER — DIPHENHYDRAMINE HCL 50 MG
50 CAPSULE ORAL AT BEDTIME
Refills: 0 | Status: DISCONTINUED | OUTPATIENT
Start: 2024-04-24 | End: 2024-04-26

## 2024-04-24 RX ORDER — POTASSIUM PHOSPHATE, MONOBASIC POTASSIUM PHOSPHATE, DIBASIC 236; 224 MG/ML; MG/ML
15 INJECTION, SOLUTION INTRAVENOUS ONCE
Refills: 0 | Status: COMPLETED | OUTPATIENT
Start: 2024-04-24 | End: 2024-04-24

## 2024-04-24 RX ADMIN — HEPARIN SODIUM 5000 UNIT(S): 5000 INJECTION INTRAVENOUS; SUBCUTANEOUS at 05:12

## 2024-04-24 RX ADMIN — HEPARIN SODIUM 5000 UNIT(S): 5000 INJECTION INTRAVENOUS; SUBCUTANEOUS at 14:01

## 2024-04-24 RX ADMIN — SODIUM CHLORIDE 125 MILLILITER(S): 9 INJECTION, SOLUTION INTRAVENOUS at 18:11

## 2024-04-24 RX ADMIN — PANTOPRAZOLE SODIUM 40 MILLIGRAM(S): 20 TABLET, DELAYED RELEASE ORAL at 07:08

## 2024-04-24 RX ADMIN — HEPARIN SODIUM 5000 UNIT(S): 5000 INJECTION INTRAVENOUS; SUBCUTANEOUS at 21:17

## 2024-04-24 RX ADMIN — SODIUM CHLORIDE 125 MILLILITER(S): 9 INJECTION, SOLUTION INTRAVENOUS at 05:11

## 2024-04-24 RX ADMIN — Medication 50 MILLIGRAM(S): at 22:57

## 2024-04-24 RX ADMIN — Medication 50 MILLIEQUIVALENT(S): at 05:11

## 2024-04-24 RX ADMIN — Medication 1 SPRAY(S): at 15:28

## 2024-04-24 RX ADMIN — POTASSIUM PHOSPHATE, MONOBASIC POTASSIUM PHOSPHATE, DIBASIC 62.5 MILLIMOLE(S): 236; 224 INJECTION, SOLUTION INTRAVENOUS at 18:12

## 2024-04-24 RX ADMIN — Medication 1 SPRAY(S): at 21:17

## 2024-04-25 LAB
ANION GAP SERPL CALC-SCNC: 12 MMOL/L — SIGNIFICANT CHANGE UP (ref 5–17)
BASOPHILS # BLD AUTO: 0.02 K/UL — SIGNIFICANT CHANGE UP (ref 0–0.2)
BASOPHILS NFR BLD AUTO: 0.4 % — SIGNIFICANT CHANGE UP (ref 0–2)
BUN SERPL-MCNC: 14 MG/DL — SIGNIFICANT CHANGE UP (ref 7–23)
CALCIUM SERPL-MCNC: 9.5 MG/DL — SIGNIFICANT CHANGE UP (ref 8.4–10.5)
CHLORIDE SERPL-SCNC: 104 MMOL/L — SIGNIFICANT CHANGE UP (ref 96–108)
CO2 SERPL-SCNC: 24 MMOL/L — SIGNIFICANT CHANGE UP (ref 22–31)
CREAT SERPL-MCNC: 0.99 MG/DL — SIGNIFICANT CHANGE UP (ref 0.5–1.3)
EGFR: 66 ML/MIN/1.73M2 — SIGNIFICANT CHANGE UP
EOSINOPHIL # BLD AUTO: 0.06 K/UL — SIGNIFICANT CHANGE UP (ref 0–0.5)
EOSINOPHIL NFR BLD AUTO: 1.1 % — SIGNIFICANT CHANGE UP (ref 0–6)
GLUCOSE SERPL-MCNC: 71 MG/DL — SIGNIFICANT CHANGE UP (ref 70–99)
HCT VFR BLD CALC: 36.5 % — SIGNIFICANT CHANGE UP (ref 34.5–45)
HGB BLD-MCNC: 11.3 G/DL — LOW (ref 11.5–15.5)
IMM GRANULOCYTES NFR BLD AUTO: 0.2 % — SIGNIFICANT CHANGE UP (ref 0–0.9)
LYMPHOCYTES # BLD AUTO: 1.73 K/UL — SIGNIFICANT CHANGE UP (ref 1–3.3)
LYMPHOCYTES # BLD AUTO: 30.6 % — SIGNIFICANT CHANGE UP (ref 13–44)
MAGNESIUM SERPL-MCNC: 2 MG/DL — SIGNIFICANT CHANGE UP (ref 1.6–2.6)
MCHC RBC-ENTMCNC: 30.5 PG — SIGNIFICANT CHANGE UP (ref 27–34)
MCHC RBC-ENTMCNC: 31 GM/DL — LOW (ref 32–36)
MCV RBC AUTO: 98.4 FL — SIGNIFICANT CHANGE UP (ref 80–100)
MONOCYTES # BLD AUTO: 0.46 K/UL — SIGNIFICANT CHANGE UP (ref 0–0.9)
MONOCYTES NFR BLD AUTO: 8.1 % — SIGNIFICANT CHANGE UP (ref 2–14)
NEUTROPHILS # BLD AUTO: 3.37 K/UL — SIGNIFICANT CHANGE UP (ref 1.8–7.4)
NEUTROPHILS NFR BLD AUTO: 59.6 % — SIGNIFICANT CHANGE UP (ref 43–77)
NRBC # BLD: 0 /100 WBCS — SIGNIFICANT CHANGE UP (ref 0–0)
PHOSPHATE SERPL-MCNC: 3.2 MG/DL — SIGNIFICANT CHANGE UP (ref 2.5–4.5)
PLATELET # BLD AUTO: 165 K/UL — SIGNIFICANT CHANGE UP (ref 150–400)
POTASSIUM SERPL-MCNC: 3.7 MMOL/L — SIGNIFICANT CHANGE UP (ref 3.5–5.3)
POTASSIUM SERPL-SCNC: 3.7 MMOL/L — SIGNIFICANT CHANGE UP (ref 3.5–5.3)
RBC # BLD: 3.71 M/UL — LOW (ref 3.8–5.2)
RBC # FLD: 14.9 % — HIGH (ref 10.3–14.5)
SODIUM SERPL-SCNC: 140 MMOL/L — SIGNIFICANT CHANGE UP (ref 135–145)
WBC # BLD: 5.65 K/UL — SIGNIFICANT CHANGE UP (ref 3.8–10.5)
WBC # FLD AUTO: 5.65 K/UL — SIGNIFICANT CHANGE UP (ref 3.8–10.5)

## 2024-04-25 PROCEDURE — 99232 SBSQ HOSP IP/OBS MODERATE 35: CPT

## 2024-04-25 RX ORDER — POTASSIUM CHLORIDE 20 MEQ
20 PACKET (EA) ORAL ONCE
Refills: 0 | Status: COMPLETED | OUTPATIENT
Start: 2024-04-25 | End: 2024-04-25

## 2024-04-25 RX ORDER — SODIUM CHLORIDE 9 MG/ML
3 INJECTION INTRAMUSCULAR; INTRAVENOUS; SUBCUTANEOUS EVERY 8 HOURS
Refills: 0 | Status: DISCONTINUED | OUTPATIENT
Start: 2024-04-25 | End: 2024-04-26

## 2024-04-25 RX ORDER — ACETAMINOPHEN 500 MG
1000 TABLET ORAL ONCE
Refills: 0 | Status: DISCONTINUED | OUTPATIENT
Start: 2024-04-25 | End: 2024-04-26

## 2024-04-25 RX ADMIN — SODIUM CHLORIDE 3 MILLILITER(S): 9 INJECTION INTRAMUSCULAR; INTRAVENOUS; SUBCUTANEOUS at 13:37

## 2024-04-25 RX ADMIN — PANTOPRAZOLE SODIUM 40 MILLIGRAM(S): 20 TABLET, DELAYED RELEASE ORAL at 07:01

## 2024-04-25 RX ADMIN — HEPARIN SODIUM 5000 UNIT(S): 5000 INJECTION INTRAVENOUS; SUBCUTANEOUS at 06:57

## 2024-04-25 RX ADMIN — Medication 400 MILLIGRAM(S): at 03:27

## 2024-04-25 RX ADMIN — Medication 20 MILLIEQUIVALENT(S): at 10:19

## 2024-04-25 RX ADMIN — SODIUM CHLORIDE 3 MILLILITER(S): 9 INJECTION INTRAMUSCULAR; INTRAVENOUS; SUBCUTANEOUS at 21:52

## 2024-04-25 RX ADMIN — Medication 1000 MILLIGRAM(S): at 04:00

## 2024-04-25 RX ADMIN — HEPARIN SODIUM 5000 UNIT(S): 5000 INJECTION INTRAVENOUS; SUBCUTANEOUS at 13:38

## 2024-04-25 RX ADMIN — HEPARIN SODIUM 5000 UNIT(S): 5000 INJECTION INTRAVENOUS; SUBCUTANEOUS at 21:58

## 2024-04-26 ENCOUNTER — TRANSCRIPTION ENCOUNTER (OUTPATIENT)
Age: 59
End: 2024-04-26

## 2024-04-26 VITALS
OXYGEN SATURATION: 99 % | TEMPERATURE: 99 F | SYSTOLIC BLOOD PRESSURE: 149 MMHG | HEART RATE: 76 BPM | DIASTOLIC BLOOD PRESSURE: 81 MMHG | RESPIRATION RATE: 17 BRPM

## 2024-04-26 PROBLEM — E78.5 HYPERLIPIDEMIA, UNSPECIFIED: Chronic | Status: ACTIVE | Noted: 2024-04-18

## 2024-04-26 PROBLEM — C90.00 MULTIPLE MYELOMA NOT HAVING ACHIEVED REMISSION: Chronic | Status: ACTIVE | Noted: 2024-04-18

## 2024-04-26 LAB
ANION GAP SERPL CALC-SCNC: 11 MMOL/L — SIGNIFICANT CHANGE UP (ref 5–17)
BASOPHILS # BLD AUTO: 0.02 K/UL — SIGNIFICANT CHANGE UP (ref 0–0.2)
BASOPHILS NFR BLD AUTO: 0.4 % — SIGNIFICANT CHANGE UP (ref 0–2)
BLD GP AB SCN SERPL QL: POSITIVE — SIGNIFICANT CHANGE UP
BUN SERPL-MCNC: 8 MG/DL — SIGNIFICANT CHANGE UP (ref 7–23)
CALCIUM SERPL-MCNC: 9.3 MG/DL — SIGNIFICANT CHANGE UP (ref 8.4–10.5)
CHLORIDE SERPL-SCNC: 105 MMOL/L — SIGNIFICANT CHANGE UP (ref 96–108)
CO2 SERPL-SCNC: 24 MMOL/L — SIGNIFICANT CHANGE UP (ref 22–31)
CREAT SERPL-MCNC: 0.87 MG/DL — SIGNIFICANT CHANGE UP (ref 0.5–1.3)
EGFR: 77 ML/MIN/1.73M2 — SIGNIFICANT CHANGE UP
EOSINOPHIL # BLD AUTO: 0.09 K/UL — SIGNIFICANT CHANGE UP (ref 0–0.5)
EOSINOPHIL NFR BLD AUTO: 1.9 % — SIGNIFICANT CHANGE UP (ref 0–6)
GLUCOSE SERPL-MCNC: 93 MG/DL — SIGNIFICANT CHANGE UP (ref 70–99)
HCT VFR BLD CALC: 34.7 % — SIGNIFICANT CHANGE UP (ref 34.5–45)
HGB BLD-MCNC: 11.1 G/DL — LOW (ref 11.5–15.5)
IMM GRANULOCYTES NFR BLD AUTO: 0.2 % — SIGNIFICANT CHANGE UP (ref 0–0.9)
LYMPHOCYTES # BLD AUTO: 1.45 K/UL — SIGNIFICANT CHANGE UP (ref 1–3.3)
LYMPHOCYTES # BLD AUTO: 31.1 % — SIGNIFICANT CHANGE UP (ref 13–44)
MAGNESIUM SERPL-MCNC: 2 MG/DL — SIGNIFICANT CHANGE UP (ref 1.6–2.6)
MCHC RBC-ENTMCNC: 31 PG — SIGNIFICANT CHANGE UP (ref 27–34)
MCHC RBC-ENTMCNC: 32 GM/DL — SIGNIFICANT CHANGE UP (ref 32–36)
MCV RBC AUTO: 96.9 FL — SIGNIFICANT CHANGE UP (ref 80–100)
MONOCYTES # BLD AUTO: 0.46 K/UL — SIGNIFICANT CHANGE UP (ref 0–0.9)
MONOCYTES NFR BLD AUTO: 9.9 % — SIGNIFICANT CHANGE UP (ref 2–14)
NEUTROPHILS # BLD AUTO: 2.63 K/UL — SIGNIFICANT CHANGE UP (ref 1.8–7.4)
NEUTROPHILS NFR BLD AUTO: 56.5 % — SIGNIFICANT CHANGE UP (ref 43–77)
NRBC # BLD: 0 /100 WBCS — SIGNIFICANT CHANGE UP (ref 0–0)
PHOSPHATE SERPL-MCNC: 3.1 MG/DL — SIGNIFICANT CHANGE UP (ref 2.5–4.5)
PLATELET # BLD AUTO: 166 K/UL — SIGNIFICANT CHANGE UP (ref 150–400)
POTASSIUM SERPL-MCNC: 3.9 MMOL/L — SIGNIFICANT CHANGE UP (ref 3.5–5.3)
POTASSIUM SERPL-SCNC: 3.9 MMOL/L — SIGNIFICANT CHANGE UP (ref 3.5–5.3)
RBC # BLD: 3.58 M/UL — LOW (ref 3.8–5.2)
RBC # FLD: 14.6 % — HIGH (ref 10.3–14.5)
RH IG SCN BLD-IMP: POSITIVE — SIGNIFICANT CHANGE UP
SODIUM SERPL-SCNC: 140 MMOL/L — SIGNIFICANT CHANGE UP (ref 135–145)
SURGICAL PATHOLOGY STUDY: SIGNIFICANT CHANGE UP
WBC # BLD: 4.66 K/UL — SIGNIFICANT CHANGE UP (ref 3.8–10.5)
WBC # FLD AUTO: 4.66 K/UL — SIGNIFICANT CHANGE UP (ref 3.8–10.5)

## 2024-04-26 PROCEDURE — 83735 ASSAY OF MAGNESIUM: CPT

## 2024-04-26 PROCEDURE — 85025 COMPLETE CBC W/AUTO DIFF WBC: CPT

## 2024-04-26 PROCEDURE — 71045 X-RAY EXAM CHEST 1 VIEW: CPT

## 2024-04-26 PROCEDURE — 80048 BASIC METABOLIC PNL TOTAL CA: CPT

## 2024-04-26 PROCEDURE — 80053 COMPREHEN METABOLIC PANEL: CPT

## 2024-04-26 PROCEDURE — 99232 SBSQ HOSP IP/OBS MODERATE 35: CPT | Mod: 24

## 2024-04-26 PROCEDURE — 86970 RBC PRETX INCUBATJ W/CHEMICL: CPT

## 2024-04-26 PROCEDURE — 36415 COLL VENOUS BLD VENIPUNCTURE: CPT

## 2024-04-26 PROCEDURE — 86901 BLOOD TYPING SEROLOGIC RH(D): CPT

## 2024-04-26 PROCEDURE — 84100 ASSAY OF PHOSPHORUS: CPT

## 2024-04-26 PROCEDURE — 86880 COOMBS TEST DIRECT: CPT

## 2024-04-26 PROCEDURE — 99231 SBSQ HOSP IP/OBS SF/LOW 25: CPT

## 2024-04-26 PROCEDURE — 86850 RBC ANTIBODY SCREEN: CPT

## 2024-04-26 PROCEDURE — 86900 BLOOD TYPING SEROLOGIC ABO: CPT

## 2024-04-26 RX ORDER — DIPHENHYDRAMINE HCL 50 MG
1 CAPSULE ORAL
Refills: 0 | DISCHARGE

## 2024-04-26 RX ADMIN — SODIUM CHLORIDE 3 MILLILITER(S): 9 INJECTION INTRAMUSCULAR; INTRAVENOUS; SUBCUTANEOUS at 13:53

## 2024-04-26 RX ADMIN — PANTOPRAZOLE SODIUM 40 MILLIGRAM(S): 20 TABLET, DELAYED RELEASE ORAL at 08:32

## 2024-04-26 RX ADMIN — HEPARIN SODIUM 5000 UNIT(S): 5000 INJECTION INTRAVENOUS; SUBCUTANEOUS at 16:01

## 2024-04-26 RX ADMIN — SODIUM CHLORIDE 3 MILLILITER(S): 9 INJECTION INTRAMUSCULAR; INTRAVENOUS; SUBCUTANEOUS at 05:26

## 2024-04-26 RX ADMIN — HEPARIN SODIUM 5000 UNIT(S): 5000 INJECTION INTRAVENOUS; SUBCUTANEOUS at 08:32

## 2024-04-26 NOTE — DISCHARGE NOTE NURSING/CASE MANAGEMENT/SOCIAL WORK - PATIENT PORTAL LINK FT
You can access the FollowMyHealth Patient Portal offered by [facility] by registering at the following website: [email or web address]. By joining [employer]’s FollowMyHealth portal, you will also be able to view your health information using other applications (apps) compatible with our system.

## 2024-04-26 NOTE — PROGRESS NOTE ADULT - ATTENDING COMMENTS
Gyn attg:   patient seen and examined, pain improved with NGT. Surgery consult appreciated. Discussed plan with Dr Basurto for continued decompression and Xray studies with gastrografin planed for tomorrow. Reviewed plan with patient.     Pedro Marin MD
Patient is a 59 year old woman with history of hysterectomy on 4/19/24 who developed pain, nausea, vomiting over past day and has clinical, laboratory, radiographic findings concerning for small bowel obstruction. At time of evaluation, afebrile, hemodynamically stable, abdomen soft, minimal tenderness, mild distension, no rebound or guarding, incisions c/d/i. CT imaging reviewed demonstrates transition point in pelvis, post operative changes, not concerning for ischemia.    Feeling much better after NG tube decompression  Abdomen softer, less distended  No flatus or BM yet  Continue NPO, IVF, NG, plan for GG challenge tmrw
Cross coverage note for Dr. Pedro Marin  Agree with above note by Dr. Mayo PGY4.  In sum, patient is 58 yo  POD#7 s/p TLH RS enterolysis (neg air leak test) and cysto, now HD#4 after readmission for high grade SBO. Initially presented to ED secondary to acute onset severe abdominal pain, > 20 episodes of emesis, and one episode of diarrhea.  She is clinically doing well at this time, s/p NGT, advanced on diet with return of bowel function, without nausea or vomiting. Tolerating soft diet today without issues. Otherwise able to ambulate, void, pass flatus, and having BMs. I saw and evaluated patient and discussed this plan of care with her, and she is amenable to the plan as discussed.    Sapphire Troy MD
Patient is a 59 year old woman with history of hysterectomy on 4/19/24 who developed pain, nausea, vomiting over past day and has clinical, laboratory, radiographic findings concerning for small bowel obstruction. At time of evaluation, afebrile, hemodynamically stable, abdomen soft, nontender, nondistended, no rebound or guarding, incisions c/d/i.    Passing flatus, had BM  Tolerating clears, advance diet  Ok for dispo  DVT ppx
Patient is a 59 year old woman with history of hysterectomy on 4/19/24 who developed pain, nausea, vomiting over past day and has clinical, laboratory, radiographic findings concerning for small bowel obstruction. At time of evaluation, afebrile, hemodynamically stable, abdomen soft, minimal tenderness, mild distension, no rebound or guarding, incisions c/d/i. CT imaging reviewed demonstrates transition point in pelvis, post operative changes, not concerning for ischemia.    Passing flatus, had BM  D/C NG, ok for clear liquid diet  Abdomen softer, less distended  DVT ppx

## 2024-04-26 NOTE — DISCHARGE NOTE PROVIDER - NSDCFUADDINST_GEN_ALL_CORE_FT
- Nothing in vagina - no intercourse, tampons, or douching until cleared by your doctor.   - Avoid swimming, tub baths, and heavy lifting until cleared by your doctor.   - Showering is ok.   - Continue oral pain medications as needed for pain. Can take tylenol 1000mg every 6 hours as needed in addition to ibuprofen 600 mg every 6 hours as needed. Alternate tylenol and ibuprofen every three hours for optimal pain control.   - Constipation may be expected. Okay to take stool softener if desired (example: colace). If no bowel movements after 4-5 days, please take stool softener.   - Follow up in office in 1-2 weeks for your postoperative visit.    - Call the office sooner if you develop any fever, heavy bleeding, or severe pain.  Go to the closest emergency room for any of these symptoms if you are not able to contact your doctor.

## 2024-04-26 NOTE — DISCHARGE NOTE PROVIDER - HOSPITAL COURSE
60 yo P0  POD4 s/p TLH RS enterolysis (neg air leak test) and cysto, presenting from [place] ED for high grade SBO. Initially presented to ED secondary to acute onset severe abdominal pain, > 20 episodes of emesis, and one episode of diarrhea.  NG tube was placed and removed after patient began to pass bowel movements. Now tolerating regular diet, pain and nausea resolved. Discharged home with stable vitals and follow up.    58 yo P0  POD4 s/p TLH RS enterolysis (neg air leak test) and cysto, presenting from [place] ED for high grade SBO. Initially presented to ED secondary to acute onset severe abdominal pain, > 20 episodes of emesis, and one episode of diarrhea.  NG tube was placed and removed after patient began to pass bowel movements. diet was advanced slowly. On day of discharge patient is tolerating soft diet, pain and nausea resolved. Discharged home with stable vitals and follow up.

## 2024-04-26 NOTE — PROGRESS NOTE ADULT - ASSESSMENT
58 yo P0 POD#6 s/p TLH RS enterolysis (neg air leak test) and cysto, now HD#3 after readmission for high grade SBO. Initially presented to ED secondary to acute onset severe abdominal pain, > 20 episodes of emesis, and one episode of diarrhea.    Neuro: Ofirmev PRN   CV: HTN, BPs largely well-controlled without medications.   Pulm: RA  GI/FEN: NPO, NGT (4/23-). LR@125. Zofran PRN, Pantoprazole IV QD  : voiding  GYN: s/p TLH, RS (4/19)   VTE: SCDs, SQH    [ ] F/u AM labs and replete PRN  [ ] Likely small bowel follow-through study today  [ ] Gen surg following - f/u recs    Talita Mayo MD PGY4  ROMULO Metcafl PGY5  ROMULO Marin MD
58 yo P0 POD#7 s/p TLH RS enterolysis (neg air leak test) and cysto, now HD#4 after readmission for high grade SBO. Initially presented to ED secondary to acute onset severe abdominal pain, > 20 episodes of emesis, and one episode of diarrhea.    Neuro: Ofirmev PRN   CV: HTN, BPs largely well-controlled without medications.   Pulm: RA  GI/FEN: CLD, s/p NGT (4/23-4/25). Heplock. Zofran PRN, Pantoprazole IV QD  : voiding  GYN: s/p TLH, RS (4/19)   VTE: SCDs, SQH    [ ] F/u AM labs and replete PRN  [ ] Gen surg following - f/u recs    Talita Mayo MD PGY4  ROMULO Metcalf PGY5  ROMULO Marin MD
59 year old female with a PMHx of myeloma (on PO and IV chemotherapy) and PSHx of anterior cervical spinal fusion now s/p laparoscopic hysterectomy with extensive EDEL on 4/19/24 now presenting with abdominal pain, nausea/vomiting x 1 day. General surgery consulted for SBO, patient with minimal NGT output overnight not passing gas no BM yet. Abdomen non tender     Recommendations  No acute surgical intervention indicated at this time  Continue NPO/IVF  Continue NGT to LIWS  Pain/nausea control prn - avoid narcotics  Serial abdominal exams, monitor bowel function possibly consider gastrografin challenge tomorrow 4/25  Surgery Team 4 will follow
59 year old female with a PMHx of myeloma (on PO and IV chemotherapy) and PSHx of anterior cervical spinal fusion now s/p laparoscopic hysterectomy with extensive EDEL on 4/19/24 now presenting with abdominal pain, nausea/vomiting x 1 day. General surgery consulted for SBO, patient tolerating clears, passing gas and having BM.      - Can advance diet to fulls   - Team 4 c will follow  
60 yo P0 POD#5 s/p TLH RS enterolysis (neg air leak test) and cysto, now HD#2 after readmission for high grade SBO. Initially presented to ED secondary to acute onset severe abdominal pain, > 20 episodes of emesis, and one episode of diarrhea.    Neuro: Ofirmev PRN   CV: HTN, BPs largely well-controlled without medications.   Pulm: RA  GI/FEN: NPO, NGT (4/23-). LR@125. Zofran PRN, Pantoprazole IV QD  : voiding  GYN: s/p TLH, BS (4/19)   VTE: SCDs, SQH    [ ] F/u AM labs and replete PRN  [ ] Gen surg following - f/u recs    Talita Mayo MD PGY4  ROMULO Marin MD  
59 year old female with a PMHx of myeloma (on PO and IV chemotherapy) and PSHx of anterior cervical spinal fusion now s/p laparoscopic hysterectomy with extensive EDEL on 4/19/24 now presenting with abdominal pain, nausea/vomiting x 1 day. General surgery consulted for SBO, s/p NGT placement improved, now passing gas and having BMs.     No acute surgical intervention indicated at this time  Can remove NGT   No gastrograffin study needed  OK to advance to clears   Pain/nausea control prn - avoid narcotics  Surgery Team 4 will follow  Case discussed with Dr. Basurto and chief resident
58 yo with small bowel obstruction after total laparoscopic hysterectomy, right salpingectomy, cystoscopy and lysis of adhesion for myomatous uterus now POD#5/HD#2 with bowel obstruction

## 2024-04-26 NOTE — DISCHARGE NOTE PROVIDER - NSDCMRMEDTOKEN_GEN_ALL_CORE_FT
Ambien 10 mg oral tablet: 1 tab(s) orally once a day (at bedtime)  aspirin 81 mg oral tablet: orally once a day  daratumumab 20 mg/mL intravenous solution: intravenous once a month  diphenhydrAMINE 25 mg oral capsule: 1 cap(s) orally once a month  Multiple Vitamins oral tablet: 1 tab(s) orally once a day  Revlimid 10 mg oral capsule: 1 cap(s) orally once a day 21 days on and 7 days off  Tylenol 325 mg oral tablet: 2 tab(s) orally once a month 650mg once a month  Vitamin D3 50 mcg (2000 intl units) oral tablet: 1 tab(s) orally once a day   Ambien 10 mg oral tablet: 1 tab(s) orally once a day (at bedtime)  aspirin 81 mg oral tablet: orally once a day  daratumumab 20 mg/mL intravenous solution: intravenous once a month  Multiple Vitamins oral tablet: 1 tab(s) orally once a day  Revlimid 10 mg oral capsule: 1 cap(s) orally once a day 21 days on and 7 days off  Tylenol 325 mg oral tablet: 2 tab(s) orally once a month 650mg once a month  Vitamin D3 50 mcg (2000 intl units) oral tablet: 1 tab(s) orally once a day

## 2024-04-26 NOTE — DISCHARGE NOTE PROVIDER - CARE PROVIDER_API CALL
Pedro Marin  Obstetrics and Gynecology  [address] [address]  Phone: [phone]  Fax: [phone]  Follow Up Time:

## 2024-04-26 NOTE — PROGRESS NOTE ADULT - SUBJECTIVE AND OBJECTIVE BOX
SUBJECTIVE: Pt seen and examined by chief resident. Pt is doing well, resting comfortably on bed. Pain controlled. +F/+BM. No nausea or vomiting, tolerating clears.     MEDICATIONS  (STANDING):  heparin   Injectable 5000 Unit(s) SubCutaneous every 8 hours  pantoprazole  Injectable 40 milliGRAM(s) IV Push every 24 hours  sodium chloride 0.9% lock flush 3 milliLiter(s) IV Push every 8 hours    MEDICATIONS  (PRN):  acetaminophen   IVPB .. 1000 milliGRAM(s) IV Intermittent once PRN Mild Pain (1 - 3), Moderate Pain (4 - 6)  benzocaine 20% Spray 1 Spray(s) Topical every 6 hours PRN throat discomfort  diphenhydrAMINE Injectable 50 milliGRAM(s) IV Push at bedtime PRN Insomnia  ondansetron Injectable 8 milliGRAM(s) IV Push every 6 hours PRN Nausea and/or Vomiting      Vital Signs Last 24 Hrs  T(C): 36.9 (26 Apr 2024 12:49), Max: 37.3 (25 Apr 2024 16:47)  T(F): 98.5 (26 Apr 2024 12:49), Max: 99.2 (25 Apr 2024 16:47)  HR: 74 (26 Apr 2024 12:49) (65 - 87)  BP: 126/85 (26 Apr 2024 12:49) (109/63 - 126/85)  BP(mean): 87 (26 Apr 2024 05:02) (87 - 87)  RR: 17 (26 Apr 2024 12:49) (17 - 18)  SpO2: 98% (26 Apr 2024 12:49) (96% - 98%)    Parameters below as of 26 Apr 2024 12:49  Patient On (Oxygen Delivery Method): room air        Physical Exam:  General Appearance: Appears well, NAD  Pulmonary: Nonlabored breathing, no respiratory distress  Abdomen: Soft, nondisteded, nontender  Extremities: WWP, SCD's in place         I&O's Detail    25 Apr 2024 07:01  -  26 Apr 2024 07:00  --------------------------------------------------------  IN:    Oral Fluid: 1320 mL  Total IN: 1320 mL    OUT:    Voided (mL): 1400 mL  Total OUT: 1400 mL    Total NET: -80 mL      26 Apr 2024 07:01  -  26 Apr 2024 12:56  --------------------------------------------------------  IN:    Oral Fluid: 100 mL  Total IN: 100 mL    OUT:    Voided (mL): 500 mL  Total OUT: 500 mL    Total NET: -400 mL          LABS:                        11.1   4.66  )-----------( 166      ( 26 Apr 2024 05:30 )             34.7     04-26    140  |  105  |  8   ----------------------------<  93  3.9   |  24  |  0.87    Ca    9.3      26 Apr 2024 05:30  Phos  3.1     04-26  Mg     2.0     04-26        Urinalysis Basic - ( 26 Apr 2024 05:30 )    Color: x / Appearance: x / SG: x / pH: x  Gluc: 93 mg/dL / Ketone: x  / Bili: x / Urobili: x   Blood: x / Protein: x / Nitrite: x   Leuk Esterase: x / RBC: x / WBC x   Sq Epi: x / Non Sq Epi: x / Bacteria: x      
  SUBJECTIVE: Pt seen and examined at bedside this am by surgery team. NGT with no output overnight, patient denies nausea, vomiting, not passing gas, no BM.    MEDICATIONS  (STANDING):  acetaminophen   IVPB .. 1000 milliGRAM(s) IV Intermittent once  heparin   Injectable 5000 Unit(s) SubCutaneous every 8 hours  lactated ringers. 1000 milliLiter(s) (125 mL/Hr) IV Continuous <Continuous>  pantoprazole  Injectable 40 milliGRAM(s) IV Push every 24 hours    MEDICATIONS  (PRN):  benzocaine 20% Spray 1 Spray(s) Topical every 6 hours PRN throat discomfort  ondansetron Injectable 8 milliGRAM(s) IV Push every 6 hours PRN Nausea and/or Vomiting      Vital Signs Last 24 Hrs  T(C): 37.1 (24 Apr 2024 08:41), Max: 37.1 (24 Apr 2024 08:41)  T(F): 98.7 (24 Apr 2024 08:41), Max: 98.7 (24 Apr 2024 08:41)  HR: 76 (24 Apr 2024 08:41) (73 - 91)  BP: 127/73 (24 Apr 2024 08:41) (127/73 - 160/89)  BP(mean): 113 (23 Apr 2024 21:00) (113 - 113)  RR: 17 (24 Apr 2024 08:41) (17 - 17)  SpO2: 97% (24 Apr 2024 08:41) (96% - 98%)    Parameters below as of 24 Apr 2024 08:41  Patient On (Oxygen Delivery Method): room air        Physical Exam  General: Patient is doing well and lying in bed comfortably  Constitutional: alert and oriented   Pulm: Nonlabored breathing, no respiratory distress; NGT in place  CV: Regular rate and rhythm, normal sinus rhythm  Abd: soft, minimally tender, primarily around incisions, softly distended. No rebound, no guarding  Incisions: c/d/i, no erythema or drainage  Extremities: warm, well perfused, no edema      I&O's Detail    24 Apr 2024 07:01  -  24 Apr 2024 11:17  --------------------------------------------------------  IN:    Lactated Ringers: 500 mL  Total IN: 500 mL    OUT:    Voided (mL): 200 mL  Total OUT: 200 mL    Total NET: 300 mL          LABS:                        12.1   6.37  )-----------( 170      ( 23 Apr 2024 22:25 )             35.9     04-23    145  |  108  |  9   ----------------------------<  104<H>  3.7   |  26  |  0.85    Ca    9.5      23 Apr 2024 22:25  Phos  3.4     04-23  Mg     2.2     04-23    TPro  5.7<L>  /  Alb  3.8  /  TBili  0.6  /  DBili  x   /  AST  17  /  ALT  14  /  AlkPhos  74  04-23      Urinalysis Basic - ( 23 Apr 2024 22:25 )    Color: x / Appearance: x / SG: x / pH: x  Gluc: 104 mg/dL / Ketone: x  / Bili: x / Urobili: x   Blood: x / Protein: x / Nitrite: x   Leuk Esterase: x / RBC: x / WBC x   Sq Epi: x / Non Sq Epi: x / Bacteria: x      
  SUBJECTIVE: Pt seen and examined by chief resident. Pt is doing well, resting comfortably on bed. Pain controlled. +F/+BM. No nausea or vomiting. No complaints at this time.    Vital Signs Last 24 Hrs  T(C): 37 (25 Apr 2024 05:25), Max: 37.3 (24 Apr 2024 16:44)  T(F): 98.6 (25 Apr 2024 05:25), Max: 99.2 (24 Apr 2024 16:44)  HR: 68 (25 Apr 2024 05:25) (68 - 80)  BP: 137/80 (25 Apr 2024 05:25) (127/73 - 137/80)  BP(mean): 99 (25 Apr 2024 05:25) (99 - 99)  RR: 18 (25 Apr 2024 05:25) (17 - 18)  SpO2: 97% (25 Apr 2024 05:25) (96% - 98%)    Parameters below as of 25 Apr 2024 05:25  Patient On (Oxygen Delivery Method): room air        I&O's Summary    24 Apr 2024 07:01  -  25 Apr 2024 06:57  --------------------------------------------------------  IN: 2750 mL / OUT: 1625 mL / NET: 1125 mL        Physical Exam:  General Appearance: Appears well, NAD  Pulmonary: Nonlabored breathing, no respiratory distress  HEENT: NGT to LIWS  Abdomen: Soft, nondisteded, nontender  Extremities: WWP, SCD's in place     LABS:                        12.6   7.27  )-----------( 174      ( 24 Apr 2024 12:00 )             39.6     04-24    143  |  107  |  11  ----------------------------<  91  4.5   |  26  |  0.94    Ca    9.6      24 Apr 2024 12:00  Phos  2.6     04-24  Mg     2.1     04-24    TPro  5.7<L>  /  Alb  3.8  /  TBili  0.6  /  DBili  x   /  AST  17  /  ALT  14  /  AlkPhos  74  04-23      Urinalysis Basic - ( 24 Apr 2024 12:00 )    Color: x / Appearance: x / SG: x / pH: x  Gluc: 91 mg/dL / Ketone: x  / Bili: x / Urobili: x   Blood: x / Protein: x / Nitrite: x   Leuk Esterase: x / RBC: x / WBC x   Sq Epi: x / Non Sq Epi: x / Bacteria: x      
49 yo HD#1 POD #5 resting well overnight, NPO, afebrile, NGT in place, pain well controlled.   VSS, reviewed labs  Abd: soft, non tender, incisions clean, dry, intact, hypoactive bowel sounds  Ext: non tender, equal bilateally
Patient evaluated at bedside. No acute events overnight. Patient denies chest pain, SOB, nausea, vomiting. No pain. Patient is ambulating independently, voiding spontaneously, passing flatus, and tolerating a clear liquid diet.      T(C): 36.9 (04-26-24 @ 05:02), Max: 37.3 (04-25-24 @ 16:47)  HR: 73 (04-26-24 @ 05:02) (65 - 78)  BP: 120/71 (04-26-24 @ 05:02) (108/69 - 133/82)  RR: 17 (04-26-24 @ 05:02) (17 - 18)  SpO2: 96% (04-26-24 @ 05:02) (96% - 99%)  Wt(kg): --    Gen: Well-appearing. NAD.  Resp: Breathing comfortably on RA.  Abd: Soft, non-tender, non-distended, normoactive bowel sounds  Ext: No calf tenderness        04-24 @ 07:01  -  04-25 @ 07:00  --------------------------------------------------------  IN: 2875 mL / OUT: 1650 mL / NET: 1225 mL    04-25 @ 07:01  -  04-26 @ 06:55  --------------------------------------------------------  IN: 1320 mL / OUT: 1400 mL / NET: -80 mL            acetaminophen   IVPB .. 1000 milliGRAM(s) IV Intermittent once PRN  benzocaine 20% Spray 1 Spray(s) Topical every 6 hours PRN  diphenhydrAMINE Injectable 50 milliGRAM(s) IV Push at bedtime PRN  heparin   Injectable 5000 Unit(s) SubCutaneous every 8 hours  ondansetron Injectable 8 milliGRAM(s) IV Push every 6 hours PRN  pantoprazole  Injectable 40 milliGRAM(s) IV Push every 24 hours  sodium chloride 0.9% lock flush 3 milliLiter(s) IV Push every 8 hours            
Patient evaluated at bedside. No acute events overnight. Patient denies chest pain, SOB, nausea, vomiting. Has not yet passed flatus. No pain. Patient is ambulating independently, voiding spontaneously.      T(C): 37.1 (04-24-24 @ 08:41), Max: 37.1 (04-24-24 @ 08:41)  HR: 76 (04-24-24 @ 08:41) (73 - 91)  BP: 127/73 (04-24-24 @ 08:41) (127/73 - 160/89)  RR: 17 (04-24-24 @ 08:41) (17 - 17)  SpO2: 97% (04-24-24 @ 08:41) (96% - 98%)  Wt(kg): --    Gen: Well-appearing. NAD.  Resp: Breathing comfortably on RA.  Abd: Soft, non-tender, non-distended, hypoactive bowel sounds, not high-pitched, no tinkling. NGT in place. No output.  Ext: No calf tenderness        04-24 @ 07:01  -  04-24 @ 11:27  --------------------------------------------------------  IN: 500 mL / OUT: 200 mL / NET: 300 mL            acetaminophen   IVPB .. 1000 milliGRAM(s) IV Intermittent once  benzocaine 20% Spray 1 Spray(s) Topical every 6 hours PRN  heparin   Injectable 5000 Unit(s) SubCutaneous every 8 hours  lactated ringers. 1000 milliLiter(s) IV Continuous <Continuous>  ondansetron Injectable 8 milliGRAM(s) IV Push every 6 hours PRN  pantoprazole  Injectable 40 milliGRAM(s) IV Push every 24 hours            
Patient evaluated at bedside. No acute events overnight. Patient denies chest pain, SOB, nausea, vomiting. Passed flatus and had a BM yesterday. NGT with moderate output overnight    T(C): 37 (04-25-24 @ 05:25), Max: 37.3 (04-24-24 @ 16:44)  HR: 68 (04-25-24 @ 05:25) (68 - 80)  BP: 137/80 (04-25-24 @ 05:25) (127/73 - 137/80)  RR: 18 (04-25-24 @ 05:25) (17 - 18)  SpO2: 97% (04-25-24 @ 05:25) (96% - 98%)  Wt(kg): --    Gen: Well-appearing. NAD.  Resp: Breathing comfortably on RA.  Abd: Soft, non-tender, non-distended, hypoactive bowel sounds, not high-pitched, no tinkling  Ext: No calf tenderness        04-24 @ 07:01  -  04-25 @ 06:47  --------------------------------------------------------  IN: 2750 mL / OUT: 1625 mL / NET: 1125 mL            benzocaine 20% Spray 1 Spray(s) Topical every 6 hours PRN  diphenhydrAMINE Injectable 50 milliGRAM(s) IV Push at bedtime PRN  heparin   Injectable 5000 Unit(s) SubCutaneous every 8 hours  lactated ringers. 1000 milliLiter(s) IV Continuous <Continuous>  ondansetron Injectable 8 milliGRAM(s) IV Push every 6 hours PRN  pantoprazole  Injectable 40 milliGRAM(s) IV Push every 24 hours

## 2024-04-26 NOTE — DISCHARGE NOTE PROVIDER - NSDCFUSCHEDAPPT_GEN_ALL_CORE_FT
Pedro Marin [facility]  OBGYANGEL [address]  Scheduled Appointment: 05/09/2024    Pedro Marin [facility]  BERTO [address]  Scheduled Appointment: 06/04/2024

## 2024-04-26 NOTE — PROGRESS NOTE ADULT - TIME BILLING
evaluation and management of small bowel obstruction, review of labs and imaging, discussion with patient, discussion with consultants, documentation.
evaluation and management of small bowel obstruction, review of labs and imaging, discussion with patient and consultants, documentation.
evaluation and management of small bowel obstruction, review of labs and imaging, discussion with patient and consultants, documentation

## 2024-04-28 ENCOUNTER — RESULT REVIEW (OUTPATIENT)
Age: 59
End: 2024-04-28

## 2024-04-28 ENCOUNTER — INPATIENT (INPATIENT)
Facility: HOSPITAL | Age: 59
LOS: 3 days | Discharge: HOME | DRG: 336 | End: 2024-05-02
Attending: STUDENT IN AN ORGANIZED HEALTH CARE EDUCATION/TRAINING PROGRAM | Admitting: OBSTETRICS & GYNECOLOGY
Payer: COMMERCIAL

## 2024-04-28 ENCOUNTER — TRANSCRIPTION ENCOUNTER (OUTPATIENT)
Age: 59
End: 2024-04-28

## 2024-04-28 VITALS
HEART RATE: 95 BPM | OXYGEN SATURATION: 98 % | HEIGHT: 69 IN | WEIGHT: 205.03 LBS | RESPIRATION RATE: 16 BRPM | SYSTOLIC BLOOD PRESSURE: 146 MMHG | DIASTOLIC BLOOD PRESSURE: 84 MMHG | TEMPERATURE: 99 F

## 2024-04-28 DIAGNOSIS — Z98.890 OTHER SPECIFIED POSTPROCEDURAL STATES: Chronic | ICD-10-CM

## 2024-04-28 DIAGNOSIS — Z87.59 PERSONAL HISTORY OF OTHER COMPLICATIONS OF PREGNANCY, CHILDBIRTH AND THE PUERPERIUM: Chronic | ICD-10-CM

## 2024-04-28 LAB
ALBUMIN SERPL ELPH-MCNC: 3.9 G/DL — SIGNIFICANT CHANGE UP (ref 3.3–5)
ALBUMIN SERPL ELPH-MCNC: 4.3 G/DL — SIGNIFICANT CHANGE UP (ref 3.3–5)
ALP SERPL-CCNC: 83 U/L — SIGNIFICANT CHANGE UP (ref 40–120)
ALP SERPL-CCNC: SIGNIFICANT CHANGE UP (ref 40–120)
ALT FLD-CCNC: 21 U/L — SIGNIFICANT CHANGE UP (ref 10–45)
ALT FLD-CCNC: SIGNIFICANT CHANGE UP (ref 10–45)
ANION GAP SERPL CALC-SCNC: 16 MMOL/L — SIGNIFICANT CHANGE UP (ref 5–17)
ANION GAP SERPL CALC-SCNC: 19 MMOL/L — HIGH (ref 5–17)
AST SERPL-CCNC: 26 U/L — SIGNIFICANT CHANGE UP (ref 10–40)
AST SERPL-CCNC: SIGNIFICANT CHANGE UP (ref 10–40)
BASE EXCESS BLDV CALC-SCNC: -3.6 MMOL/L — LOW (ref -2–3)
BASOPHILS # BLD AUTO: 0.01 K/UL — SIGNIFICANT CHANGE UP (ref 0–0.2)
BASOPHILS NFR BLD AUTO: 0.1 % — SIGNIFICANT CHANGE UP (ref 0–2)
BILIRUB SERPL-MCNC: 0.4 MG/DL — SIGNIFICANT CHANGE UP (ref 0.2–1.2)
BILIRUB SERPL-MCNC: 0.5 MG/DL — SIGNIFICANT CHANGE UP (ref 0.2–1.2)
BUN SERPL-MCNC: 8 MG/DL — SIGNIFICANT CHANGE UP (ref 7–23)
BUN SERPL-MCNC: 8 MG/DL — SIGNIFICANT CHANGE UP (ref 7–23)
CALCIUM SERPL-MCNC: 10 MG/DL — SIGNIFICANT CHANGE UP (ref 8.4–10.5)
CALCIUM SERPL-MCNC: 9.2 MG/DL — SIGNIFICANT CHANGE UP (ref 8.4–10.5)
CHLORIDE SERPL-SCNC: 102 MMOL/L — SIGNIFICANT CHANGE UP (ref 96–108)
CHLORIDE SERPL-SCNC: 105 MMOL/L — SIGNIFICANT CHANGE UP (ref 96–108)
CO2 BLDV-SCNC: 21.6 MMOL/L — LOW (ref 22–26)
CO2 SERPL-SCNC: 19 MMOL/L — LOW (ref 22–31)
CO2 SERPL-SCNC: 20 MMOL/L — LOW (ref 22–31)
CREAT SERPL-MCNC: 0.75 MG/DL — SIGNIFICANT CHANGE UP (ref 0.5–1.3)
CREAT SERPL-MCNC: 0.77 MG/DL — SIGNIFICANT CHANGE UP (ref 0.5–1.3)
EGFR: 89 ML/MIN/1.73M2 — SIGNIFICANT CHANGE UP
EGFR: 92 ML/MIN/1.73M2 — SIGNIFICANT CHANGE UP
EOSINOPHIL # BLD AUTO: 0.01 K/UL — SIGNIFICANT CHANGE UP (ref 0–0.5)
EOSINOPHIL NFR BLD AUTO: 0.1 % — SIGNIFICANT CHANGE UP (ref 0–6)
GAS PNL BLDV: SIGNIFICANT CHANGE UP
GLUCOSE SERPL-MCNC: 105 MG/DL — HIGH (ref 70–99)
GLUCOSE SERPL-MCNC: 112 MG/DL — HIGH (ref 70–99)
HCO3 BLDV-SCNC: 21 MMOL/L — LOW (ref 22–29)
HCT VFR BLD CALC: 42.8 % — SIGNIFICANT CHANGE UP (ref 34.5–45)
HGB BLD-MCNC: 14 G/DL — SIGNIFICANT CHANGE UP (ref 11.5–15.5)
IMM GRANULOCYTES NFR BLD AUTO: 0.2 % — SIGNIFICANT CHANGE UP (ref 0–0.9)
LACTATE SERPL-SCNC: 1.2 MMOL/L — SIGNIFICANT CHANGE UP (ref 0.5–2)
LIDOCAIN IGE QN: 47 U/L — SIGNIFICANT CHANGE UP (ref 7–60)
LYMPHOCYTES # BLD AUTO: 1.11 K/UL — SIGNIFICANT CHANGE UP (ref 1–3.3)
LYMPHOCYTES # BLD AUTO: 11.5 % — LOW (ref 13–44)
MAGNESIUM SERPL-MCNC: 1.9 MG/DL — SIGNIFICANT CHANGE UP (ref 1.6–2.6)
MCHC RBC-ENTMCNC: 31.1 PG — SIGNIFICANT CHANGE UP (ref 27–34)
MCHC RBC-ENTMCNC: 32.7 GM/DL — SIGNIFICANT CHANGE UP (ref 32–36)
MCV RBC AUTO: 95.1 FL — SIGNIFICANT CHANGE UP (ref 80–100)
MONOCYTES # BLD AUTO: 0.51 K/UL — SIGNIFICANT CHANGE UP (ref 0–0.9)
MONOCYTES NFR BLD AUTO: 5.3 % — SIGNIFICANT CHANGE UP (ref 2–14)
NEUTROPHILS # BLD AUTO: 7.97 K/UL — HIGH (ref 1.8–7.4)
NEUTROPHILS NFR BLD AUTO: 82.8 % — HIGH (ref 43–77)
NRBC # BLD: 0 /100 WBCS — SIGNIFICANT CHANGE UP (ref 0–0)
PCO2 BLDV: 34 MMHG — LOW (ref 39–42)
PH BLDV: 7.39 — SIGNIFICANT CHANGE UP (ref 7.32–7.43)
PHOSPHATE SERPL-MCNC: 2.9 MG/DL — SIGNIFICANT CHANGE UP (ref 2.5–4.5)
PLATELET # BLD AUTO: 201 K/UL — SIGNIFICANT CHANGE UP (ref 150–400)
PO2 BLDV: 104 MMHG — HIGH (ref 25–45)
POTASSIUM SERPL-MCNC: 3.8 MMOL/L — SIGNIFICANT CHANGE UP (ref 3.5–5.3)
POTASSIUM SERPL-MCNC: SIGNIFICANT CHANGE UP (ref 3.5–5.3)
POTASSIUM SERPL-SCNC: 3.8 MMOL/L — SIGNIFICANT CHANGE UP (ref 3.5–5.3)
POTASSIUM SERPL-SCNC: SIGNIFICANT CHANGE UP (ref 3.5–5.3)
PROT SERPL-MCNC: 6.3 G/DL — SIGNIFICANT CHANGE UP (ref 6–8.3)
PROT SERPL-MCNC: 7.1 G/DL — SIGNIFICANT CHANGE UP (ref 6–8.3)
RBC # BLD: 4.5 M/UL — SIGNIFICANT CHANGE UP (ref 3.8–5.2)
RBC # FLD: 14.7 % — HIGH (ref 10.3–14.5)
SAO2 % BLDV: 98.7 % — HIGH (ref 67–88)
SODIUM SERPL-SCNC: 140 MMOL/L — SIGNIFICANT CHANGE UP (ref 135–145)
SODIUM SERPL-SCNC: 141 MMOL/L — SIGNIFICANT CHANGE UP (ref 135–145)
WBC # BLD: 9.63 K/UL — SIGNIFICANT CHANGE UP (ref 3.8–10.5)
WBC # FLD AUTO: 9.63 K/UL — SIGNIFICANT CHANGE UP (ref 3.8–10.5)

## 2024-04-28 PROCEDURE — 74177 CT ABD & PELVIS W/CONTRAST: CPT | Mod: 26,MC

## 2024-04-28 PROCEDURE — 99285 EMERGENCY DEPT VISIT HI MDM: CPT

## 2024-04-28 PROCEDURE — 99221 1ST HOSP IP/OBS SF/LOW 40: CPT | Mod: GC,24

## 2024-04-28 PROCEDURE — 88304 TISSUE EXAM BY PATHOLOGIST: CPT | Mod: 26

## 2024-04-28 PROCEDURE — 71045 X-RAY EXAM CHEST 1 VIEW: CPT | Mod: 26

## 2024-04-28 RX ORDER — ONDANSETRON 8 MG/1
8 TABLET, FILM COATED ORAL EVERY 6 HOURS
Refills: 0 | Status: DISCONTINUED | OUTPATIENT
Start: 2024-04-28 | End: 2024-04-29

## 2024-04-28 RX ORDER — ACETAMINOPHEN 500 MG
1000 TABLET ORAL ONCE
Refills: 0 | Status: COMPLETED | OUTPATIENT
Start: 2024-04-28 | End: 2024-04-29

## 2024-04-28 RX ORDER — ACETAMINOPHEN 500 MG
1000 TABLET ORAL ONCE
Refills: 0 | Status: COMPLETED | OUTPATIENT
Start: 2024-04-28 | End: 2024-04-28

## 2024-04-28 RX ORDER — SODIUM CHLORIDE 9 MG/ML
1000 INJECTION INTRAMUSCULAR; INTRAVENOUS; SUBCUTANEOUS ONCE
Refills: 0 | Status: COMPLETED | OUTPATIENT
Start: 2024-04-28 | End: 2024-04-28

## 2024-04-28 RX ORDER — POTASSIUM CHLORIDE 20 MEQ
40 PACKET (EA) ORAL ONCE
Refills: 0 | Status: COMPLETED | OUTPATIENT
Start: 2024-04-28 | End: 2024-04-28

## 2024-04-28 RX ORDER — SODIUM CHLORIDE 9 MG/ML
1000 INJECTION, SOLUTION INTRAVENOUS
Refills: 0 | Status: DISCONTINUED | OUTPATIENT
Start: 2024-04-28 | End: 2024-04-29

## 2024-04-28 RX ORDER — PANTOPRAZOLE SODIUM 20 MG/1
40 TABLET, DELAYED RELEASE ORAL DAILY
Refills: 0 | Status: DISCONTINUED | OUTPATIENT
Start: 2024-04-28 | End: 2024-04-29

## 2024-04-28 RX ORDER — KETOROLAC TROMETHAMINE 30 MG/ML
30 SYRINGE (ML) INJECTION EVERY 6 HOURS
Refills: 0 | Status: DISCONTINUED | OUTPATIENT
Start: 2024-04-28 | End: 2024-04-29

## 2024-04-28 RX ORDER — METOCLOPRAMIDE HCL 10 MG
10 TABLET ORAL EVERY 6 HOURS
Refills: 0 | Status: DISCONTINUED | OUTPATIENT
Start: 2024-04-28 | End: 2024-04-29

## 2024-04-28 RX ORDER — DIPHENHYDRAMINE HCL 50 MG
50 CAPSULE ORAL AT BEDTIME
Refills: 0 | Status: DISCONTINUED | OUTPATIENT
Start: 2024-04-28 | End: 2024-04-29

## 2024-04-28 RX ORDER — ONDANSETRON 8 MG/1
4 TABLET, FILM COATED ORAL ONCE
Refills: 0 | Status: COMPLETED | OUTPATIENT
Start: 2024-04-28 | End: 2024-04-28

## 2024-04-28 RX ADMIN — Medication 400 MILLIGRAM(S): at 20:34

## 2024-04-28 RX ADMIN — SODIUM CHLORIDE 1000 MILLILITER(S): 9 INJECTION INTRAMUSCULAR; INTRAVENOUS; SUBCUTANEOUS at 20:33

## 2024-04-28 RX ADMIN — ONDANSETRON 4 MILLIGRAM(S): 8 TABLET, FILM COATED ORAL at 20:34

## 2024-04-28 RX ADMIN — PANTOPRAZOLE SODIUM 40 MILLIGRAM(S): 20 TABLET, DELAYED RELEASE ORAL at 22:53

## 2024-04-28 RX ADMIN — SODIUM CHLORIDE 125 MILLILITER(S): 9 INJECTION, SOLUTION INTRAVENOUS at 22:53

## 2024-04-28 RX ADMIN — Medication 30 MILLIGRAM(S): at 23:02

## 2024-04-28 NOTE — H&P ADULT - ASSESSMENT
60 yo P0 POD9 s/p [facility] BS, s/p admission 4/23-26 for high grade SBO, now presenting for nausea, vomiting, and abdominal pain.  - Clinically and hemodynamically stable  - High grade SBO on CT  - Admit to GYN as patient is post op from GYN surgery, however will appreciate general surgery recs for SBO  - NPO w/ IV fluids, antiemetics ordered    Discussed with PGY3 Dr. Latham and attending Dr. Marin.   Riya Jacinto, PGY2

## 2024-04-28 NOTE — ED PROVIDER NOTE - PHYSICAL EXAMINATION
General:  Uncomfortable, no resp distress  HEENT:  No conjunctival injection, neck supple, no congestion   Chest:  Non-tender, no crepitance  Abdomen:  Distended, tender  :  Deferred  Rectal:  Deferred  Extremities:  Full rom   Neuro:  Alert, conversant, motor/sensory grossly intact

## 2024-04-28 NOTE — ED ADULT NURSE NOTE - OBJECTIVE STATEMENT
Patient presents to the ED complaining of nausea, vomiting and abdominal pain since this afternoon. Patient reports that she was admitted recently for a small bowel obstruction.

## 2024-04-28 NOTE — CONSULT NOTE ADULT - ASSESSMENT
58 yo P0 POD9 s/p [facility] BS, s/p admission 4/23-26 for high grade SBO, now presenting for nausea, vomiting, and abdominal pain.      incomplete

## 2024-04-28 NOTE — H&P ADULT - HISTORY OF PRESENT ILLNESS
60 yo P0 POD9 s/p [facility] BS, s/p admission 4/23-26 for high grade SBO, now presenting for nausea, vomiting, and abdominal pain. Around 2pm today she started to feel lower abdominal pain, which progress to be mostly focused in the upper abdomen. An hour later she started to have vomiting and has had greater than 10 episodes. She is unable to hold down any food or water. It feels similar to her prior episode. She had a small bowel movement this morning. Before today, she had felt well after discharge and was able to eat and drink normally.   Pt denies fever, chills, chest pain, SOB, vaginal bleeding, vaginal discharge, dysuria.       OB/GYN Hx: G1 - ectopic pregnancy.   PMHx: multiple myeloma, stopped medications 4/4 prior to surgery  SHx: UAE, L/S salpingectomy, cervical vertebral fusion, 4/19/24 [facility] BS  Meds: reglamid  10 mg, daratumumab, ASA 81, benadryl for insomnia  Allergies: NKDA:     PHYSICAL EXAM:   Vital Signs Last 24 Hrs  T(C): 36.9 (28 Apr 2024 21:39), Max: 37.3 (28 Apr 2024 19:52)  T(F): 98.4 (28 Apr 2024 21:39), Max: 99.1 (28 Apr 2024 19:52)  HR: 88 (28 Apr 2024 21:39) (88 - 95)  BP: 126/78 (28 Apr 2024 21:39) (126/78 - 146/84)  BP(mean): --  RR: 18 (28 Apr 2024 21:39) (16 - 18)  SpO2: 98% (28 Apr 2024 21:39) (98% - 98%)    Parameters below as of 28 Apr 2024 21:39  Patient On (Oxygen Delivery Method): room air        **************************  Constitutional: Alert & Oriented x3, No acute distress  Respiratory: no increased WOB  Gastrointestinal: moderately firm, tender globally however most pronounced in upper abdomen, moderately distended, voluntary guarding  Extremities: no calf tenderness or swelling    LABS:                        14.0   9.63  )-----------( 201      ( 28 Apr 2024 20:10 )             42.8     04-28    141  |  102  |  8   ----------------------------<  112<H>  See Note   |  20<L>  |  0.77    Ca    10.0      28 Apr 2024 20:10    TPro  7.1  /  Alb  4.3  /  TBili  0.5  /  DBili  x   /  AST  See Note  /  ALT  See Note  /  AlkPhos  See Note  04-28      Urinalysis Basic - ( 28 Apr 2024 20:10 )    Color: x / Appearance: x / SG: x / pH: x  Gluc: 112 mg/dL / Ketone: x  / Bili: x / Urobili: x   Blood: x / Protein: x / Nitrite: x   Leuk Esterase: x / RBC: x / WBC x   Sq Epi: x / Non Sq Epi: x / Bacteria: x          RADIOLOGY & ADDITIONAL STUDIES:  < from: CT Abdomen and Pelvis w/ IV Cont (04.23.24 @ 10:26) >  INTERPRETATION:  CLINICAL INFORMATION: Abdominal pain and vomiting status   post hysterectomy 4 days previously    COMPARISON: None.    CONTRAST/COMPLICATIONS:  IV Contrast: Omnipaque 350  90 cc administered   10 cc discarded  Oral Contrast: NONE  Complications: None reported at time of study completion    PROCEDURE:  CT of the Abdomen and Pelvis was performed.  Sagittal and coronal reformats were performed.    FINDINGS:  LOWER CHEST: Nonspecific bilateral breast skin thickening.    LIVER: Indeterminate peripheral 3.6 x 3.0 cm hypoattenuating segment 8/5   hepatic lesion associated with capsular retraction.  BILE DUCTS: Normal caliber.  GALLBLADDER: Within normal limits.  SPLEEN: Within normal limits.  PANCREAS: Within normal limits.  ADRENALS: Within normal limits.  KIDNEYS/URETERS: Within normal limits.    BLADDER: Within normal limits.  REPRODUCTIVE ORGANS: Hysterectomy.    BOWEL: High-grade small bowel obstruction, upper transition point in the   central pelvis (2, 115) where there is incomplete twisting of the   mesentery. Mild upstream small bowel obstruction and complete downstream   collapse.  Appendix not identified.  PERITONEUM: Small volume slightly hyperdense ascites and mesenteric fluid.  VESSELS: Atherosclerotic changes.  RETROPERITONEUM/LYMPH NODES: No lymphadenopathy.  ABDOMINAL WALL: Minimal postoperative changes  BONES: Numerous small lucencies scattered throughout the visualized   osseous structures.    IMPRESSION:  1.  High-grade small bowel obstruction associated with small volume   hyperdense ascites and mesenteric fluid. Consider possibility of   developing ischemia.  2.  Indeterminate 3.6 cm liver lesion, advise MRI with and without   contrast for further assessment.  3.  Numerous small lucencies scattered throughout the visualized osseous   structures. Consider heterogeneous osteopenia, myeloma, or metastatic   disease. Further workup as clinically directed and may include MRI of the   spine and/or pelvis.    < end of copied text >

## 2024-04-28 NOTE — ED PROVIDER NOTE - CLINICAL SUMMARY MEDICAL DECISION MAKING FREE TEXT BOX
22:30:  OB will admit to Dr. Marin. 60 yo F with clinical signs and symptoms of SBO.  Will initiate work up.  GYN at bedside, surgical consult called.      22:30:  OB will admit to Dr. Marin.

## 2024-04-28 NOTE — ED PROVIDER NOTE - OBJECTIVE STATEMENT
60 yo F s/p recent surgery and subsequent SBO presents for vomiting, bloating, not passing gas 58 yo s/p [facility] BS, s/p admission 4/23-26 for high grade SBO reporting vomiting, abd pain bloating starting 2pm today.  Unable to pass gas.  Symptoms same as recent SBO.  Case d/w GYN who will see patient in the ER now.

## 2024-04-28 NOTE — ED ADULT TRIAGE NOTE - CHIEF COMPLAINT QUOTE
Pt s/p lap hysterectomy on 4/19 co nausea and vomiting since 2pm this afternoon. Has been ahving multiple episodes of N/V since surgery. Hx myeloma on PO and IV chemo- last session 4/4. Pt retching in triage area.

## 2024-04-28 NOTE — H&P ADULT - ATTENDING COMMENTS
Gyn attg:     Patient readmitted for nausea and vomiting, secondary to SBO after laparoscopic hysterectomy with extensive lysis of adhesions. General surgery consulted. Plan for NGT overnight and NPO with IV Fluids.     Pedro Marin MD

## 2024-04-28 NOTE — ED PROVIDER NOTE - HOW PATIENT ADDRESSED, PROFILE
"[facility]   Physical Therapy Treatment Note     Name: Mable Krishnamurthy [facility]  Clinic Number: [id number]    Therapy Diagnosis:   Encounter Diagnoses   Name Primary?    Acute pain of left knee Yes    Decreased range of motion of left knee     Weakness of left lower extremity      Physician: Rudy Dempsey MD    Visit Date: 6/8/2022    Physician Orders: PT Eval and Treat   Medical Diagnosis from Referral: S83.242D (ICD-10-CM) - Acute medial meniscus tear of left knee, subsequent encounter  Evaluation Date: 5/30/2022  Authorization Period Expiration: 7/1/2022  Plan of Care Expiration: 7/30/2022  Visit # / Visits authorized: 4/20 (+eval)  FOTO: 1/3  PTA Visit #: 0/5     Time In: 8:00 am  Time Out: 9:00 am  Total Billable Time: 55 minutes    Precautions: Standard     Surgery: Left knee Arthroscopy on 5/27/2022. WBAT. ROM AT.     SUBJECTIVE     Pt reports: knee is doing well and swelling is still present but decreasing.     She was compliant with home exercise program.    Response to previous treatment: increased knee ROM.    Functional change: improved community ambulation.     Pain: 3/10  Location: left knee      OBJECTIVE     Objective Measures updated at progress report unless specified.     TREATMENT     Mable received the treatments listed below:      Mable received therapeutic exercises to develop strength, endurance, ROM, flexibility, posture and core stabilization for 40 minutes including:    Recumbent Bike 5 minutes   Straight leg Raises 20x   Prone TKE 5" hold, 20x  Quad sets 10" x 10 with towel roll under heel  SAQ 3" hold, 20x, 3#  LAQ with 2" hold, 20x  Standing TKE 5" hold, 20x, purple sc  Standing gastroc stretch 30" hold, 3x  Shuttle DL 6 bands 25x  Shuttle SL 4 bands 20x  Step Ups (on stairs) 2x10   Step Downs 2" 2x8   Heel slides 20x    Mable received the following manual therapy techniques: Joint mobilizations, Myofacial release and Soft tissue Mobilization were " applied to the: left knee for 15 minutes, including:    ASTYM to left knee, anterior and posterior  PF mobilizations  Grade I/II extension mobilizations.     Mable participated in neuromuscular re-education activities to improve: Balance, Coordination, Kinesthetic, Sense, Proprioception and Posture for 0 minutes. The following activities were included:      Mable participated in dynamic functional therapeutic activities to improve functional performance for 0  minutes, including:      Patient Education and Home Exercises     Education provided:   - HEP provided  - Home modalities prn  - Education on return to work in 2 weeks      Written Home Exercises Provided: yes.  Exercises were reviewed and Mable was able to demonstrate them prior to the end of the session.  Mable demonstrated good  understanding of the education provided.      See EMR under Patient Instructions for exercises provided on evaluation.    ASSESSMENT     Patient demonstrated improved extension and flexion ROM following manual treatments. No increase in pain overall during session. Pt did get fatigued along quadriceps due to eccentric focus of therex. Pt will continue to benefit from flexibility and strengthening to normalize left knee joint function.       Mable Is progressing well towards her goals.   Pt prognosis is Excellent.     Pt will continue to benefit from skilled outpatient physical therapy to address the deficits listed in the problem list box on initial evaluation, provide pt/family education and to maximize pt's level of independence in the home and community environment.     Pt's spiritual, cultural and educational needs considered and pt agreeable to plan of care and goals.     Anticipated barriers to physical therapy: None    Goals:  Short Term Goals (4 Weeks):  1. Patient will be compliant with home exercise program to supplement therapy in restoring pain free function.  2. Patient will improve impaired lower extremity manual  muscle tests to >/= 4/5 to improve dynamic hip/knee support for functional tasks.  3. Patient will demonstrate Full symmetrical ROM compared to RLE to improve gait mechanics     Long Term Goals (8 Weeks):  1. Patient will improve FOTO score to </= 25% limited to decrease perceived limitation with mobility.   2. Patient will improve impaired lower extremity manual muscle tests to >/= 4+/5 to improve dynamic hip/knee support for functional tasks.  3. Patient will demonstrate proper gait mechanics with full knee extension and no increase in pain.   4. Patient will demonstrate full squat to ground with 10# box lift with no increase in knee pain to show functional improvement.      PLAN     Continue with physical therapy as planned.     Plan of care Certification: 5/30/2022 to 7/30/2022.     Outpatient Physical Therapy 2 times weekly for 8 weeks to include the following interventions: Aquatic Therapy, Electrical Stimulation IFC, Russian, Gait Training, Manual Therapy, Moist Heat/ Ice, Neuromuscular Re-ed, Patient Education, Self Care, Therapeutic Activities, Therapeutic Exercise and Ultrasound, ASTYM, Kinesiotaping PRN, Functional Dry Needling       Nnamdi Boucher, PT, DPT     Alexandra

## 2024-04-28 NOTE — ED ADULT NURSE NOTE - NS ED NURSE LEVEL OF CONSCIOUSNESS MENTAL STATUS
Detail Level: Zone Patient Specific Counseling (Will Not Stick From Patient To Patient): .1/10/22\\nPatient instructed in self injecting his dupixent. I gave 1st dose; he gave 2nd with no prblem or question. Dupixent improved and should be sent to his home soon.\\nLoading dose of 600 mg given today in the office, \\n\\n1/5/22\\nPatient is getting surgery for his hardware out of his arm tomorrow, would like to start duxipent next week. \\nHe will come back next week for his loading dose of duxipent. \\n\\Marlene the meantime, we can continue with topical treatment, he is eager to start duxipent. \\n\\n\\n12/8/21\\nPatient will continue doxycycline 100mg, and will start betamethasone 0.05% and will apply to affected areas twice daily. Patient started paperwork for Dupixent and we will send off the script for PA once we submit the paperwork. \\n\\n\\nHe has failed in the past- oral methotrexate, topical steroids, and wet wraps. \\n\\n11/04/21 \\nHe has not used any prescription strength products for 8 months, suggested we start with topical triamcinolone, daily non sedating antihistamine such as a Claritin. He limits bathing to every other day and has been moisturizing with lotions OTC. \\nCulture was also completed today- we will notify the patient if we need to treat him further. \\n\\nDiscussed starting patient on dupixent. He has tried methotrexate previously as well. Awake/Alert/Cooperative

## 2024-04-28 NOTE — CONSULT NOTE ADULT - ATTENDING COMMENTS
Gyn attg:    patient being admitted to gynecology service, general surgery consulted    Pedro Marin MD

## 2024-04-29 LAB
ANION GAP SERPL CALC-SCNC: 14 MMOL/L — SIGNIFICANT CHANGE UP (ref 5–17)
ANISOCYTOSIS BLD QL: SLIGHT — SIGNIFICANT CHANGE UP
APPEARANCE UR: CLEAR — SIGNIFICANT CHANGE UP
BACTERIA # UR AUTO: NEGATIVE /HPF — SIGNIFICANT CHANGE UP
BASOPHILS # BLD AUTO: 0 K/UL — SIGNIFICANT CHANGE UP (ref 0–0.2)
BASOPHILS NFR BLD AUTO: 0 % — SIGNIFICANT CHANGE UP (ref 0–2)
BILIRUB UR-MCNC: NEGATIVE — SIGNIFICANT CHANGE UP
BUN SERPL-MCNC: 11 MG/DL — SIGNIFICANT CHANGE UP (ref 7–23)
CALCIUM SERPL-MCNC: 9.2 MG/DL — SIGNIFICANT CHANGE UP (ref 8.4–10.5)
CAST: 0 /LPF — SIGNIFICANT CHANGE UP (ref 0–4)
CHLORIDE SERPL-SCNC: 105 MMOL/L — SIGNIFICANT CHANGE UP (ref 96–108)
CO2 SERPL-SCNC: 21 MMOL/L — LOW (ref 22–31)
COLOR SPEC: SIGNIFICANT CHANGE UP
CREAT SERPL-MCNC: 0.74 MG/DL — SIGNIFICANT CHANGE UP (ref 0.5–1.3)
DACRYOCYTES BLD QL SMEAR: SLIGHT — SIGNIFICANT CHANGE UP
DIFF PNL FLD: ABNORMAL
EGFR: 93 ML/MIN/1.73M2 — SIGNIFICANT CHANGE UP
EOSINOPHIL # BLD AUTO: 0 K/UL — SIGNIFICANT CHANGE UP (ref 0–0.5)
EOSINOPHIL NFR BLD AUTO: 0 % — SIGNIFICANT CHANGE UP (ref 0–6)
GLUCOSE BLDC GLUCOMTR-MCNC: 121 MG/DL — HIGH (ref 70–99)
GLUCOSE SERPL-MCNC: 113 MG/DL — HIGH (ref 70–99)
GLUCOSE UR QL: NEGATIVE MG/DL — SIGNIFICANT CHANGE UP
HCT VFR BLD CALC: 40 % — SIGNIFICANT CHANGE UP (ref 34.5–45)
HGB BLD-MCNC: 12.9 G/DL — SIGNIFICANT CHANGE UP (ref 11.5–15.5)
KETONES UR-MCNC: 40 MG/DL
LEUKOCYTE ESTERASE UR-ACNC: NEGATIVE — SIGNIFICANT CHANGE UP
LYMPHOCYTES # BLD AUTO: 0.48 K/UL — LOW (ref 1–3.3)
LYMPHOCYTES # BLD AUTO: 15.5 % — SIGNIFICANT CHANGE UP (ref 13–44)
MACROCYTES BLD QL: SLIGHT — SIGNIFICANT CHANGE UP
MAGNESIUM SERPL-MCNC: 1.9 MG/DL — SIGNIFICANT CHANGE UP (ref 1.6–2.6)
MANUAL SMEAR VERIFICATION: SIGNIFICANT CHANGE UP
MCHC RBC-ENTMCNC: 30.8 PG — SIGNIFICANT CHANGE UP (ref 27–34)
MCHC RBC-ENTMCNC: 32.3 GM/DL — SIGNIFICANT CHANGE UP (ref 32–36)
MCV RBC AUTO: 95.5 FL — SIGNIFICANT CHANGE UP (ref 80–100)
MONOCYTES # BLD AUTO: 0.29 K/UL — SIGNIFICANT CHANGE UP (ref 0–0.9)
MONOCYTES NFR BLD AUTO: 9.5 % — SIGNIFICANT CHANGE UP (ref 2–14)
NEUTROPHILS # BLD AUTO: 2.31 K/UL — SIGNIFICANT CHANGE UP (ref 1.8–7.4)
NEUTROPHILS NFR BLD AUTO: 71.6 % — SIGNIFICANT CHANGE UP (ref 43–77)
NEUTS BAND # BLD: 3.4 % — SIGNIFICANT CHANGE UP (ref 0–8)
NITRITE UR-MCNC: NEGATIVE — SIGNIFICANT CHANGE UP
OVALOCYTES BLD QL SMEAR: SLIGHT — SIGNIFICANT CHANGE UP
PH UR: 5.5 — SIGNIFICANT CHANGE UP (ref 5–8)
PHOSPHATE SERPL-MCNC: 3.6 MG/DL — SIGNIFICANT CHANGE UP (ref 2.5–4.5)
PLAT MORPH BLD: NORMAL — SIGNIFICANT CHANGE UP
PLATELET # BLD AUTO: 189 K/UL — SIGNIFICANT CHANGE UP (ref 150–400)
POIKILOCYTOSIS BLD QL AUTO: SLIGHT — SIGNIFICANT CHANGE UP
POLYCHROMASIA BLD QL SMEAR: SLIGHT — SIGNIFICANT CHANGE UP
POTASSIUM SERPL-MCNC: 3.3 MMOL/L — LOW (ref 3.5–5.3)
POTASSIUM SERPL-SCNC: 3.3 MMOL/L — LOW (ref 3.5–5.3)
PROT UR-MCNC: 30 MG/DL
RAPID RVP RESULT: SIGNIFICANT CHANGE UP
RBC # BLD: 4.19 M/UL — SIGNIFICANT CHANGE UP (ref 3.8–5.2)
RBC # FLD: 15.1 % — HIGH (ref 10.3–14.5)
RBC BLD AUTO: ABNORMAL
RBC CASTS # UR COMP ASSIST: 5 /HPF — HIGH (ref 0–4)
SARS-COV-2 RNA SPEC QL NAA+PROBE: SIGNIFICANT CHANGE UP
SODIUM SERPL-SCNC: 140 MMOL/L — SIGNIFICANT CHANGE UP (ref 135–145)
SP GR SPEC: >1.03 — HIGH (ref 1–1.03)
SQUAMOUS # UR AUTO: 2 /HPF — SIGNIFICANT CHANGE UP (ref 0–5)
UROBILINOGEN FLD QL: 1 MG/DL — SIGNIFICANT CHANGE UP (ref 0.2–1)
WBC # BLD: 3.08 K/UL — LOW (ref 3.8–10.5)
WBC # FLD AUTO: 3.08 K/UL — LOW (ref 3.8–10.5)
WBC UR QL: 1 /HPF — SIGNIFICANT CHANGE UP (ref 0–5)

## 2024-04-29 PROCEDURE — 71045 X-RAY EXAM CHEST 1 VIEW: CPT | Mod: 26,76

## 2024-04-29 PROCEDURE — 44970 LAPAROSCOPY APPENDECTOMY: CPT | Mod: 22,GC

## 2024-04-29 PROCEDURE — 99231 SBSQ HOSP IP/OBS SF/LOW 25: CPT | Mod: 24,GC

## 2024-04-29 PROCEDURE — 71045 X-RAY EXAM CHEST 1 VIEW: CPT | Mod: 26,77

## 2024-04-29 PROCEDURE — 99252 IP/OBS CONSLTJ NEW/EST SF 35: CPT | Mod: 57,GC

## 2024-04-29 DEVICE — STAPLER COVIDIEN TRI-STAPLE 60MM PURPLE RELOAD: Type: IMPLANTABLE DEVICE | Status: FUNCTIONAL

## 2024-04-29 RX ORDER — ACETAMINOPHEN 500 MG
1000 TABLET ORAL ONCE
Refills: 0 | Status: COMPLETED | OUTPATIENT
Start: 2024-04-29 | End: 2024-04-29

## 2024-04-29 RX ORDER — ACETAMINOPHEN 500 MG
1000 TABLET ORAL ONCE
Refills: 0 | Status: COMPLETED | OUTPATIENT
Start: 2024-04-29 | End: 2024-04-30

## 2024-04-29 RX ORDER — HEPARIN SODIUM 5000 [USP'U]/ML
5000 INJECTION INTRAVENOUS; SUBCUTANEOUS EVERY 8 HOURS
Refills: 0 | Status: DISCONTINUED | OUTPATIENT
Start: 2024-04-29 | End: 2024-05-02

## 2024-04-29 RX ORDER — SODIUM CHLORIDE 9 MG/ML
1000 INJECTION, SOLUTION INTRAVENOUS
Refills: 0 | Status: DISCONTINUED | OUTPATIENT
Start: 2024-04-29 | End: 2024-04-30

## 2024-04-29 RX ORDER — PANTOPRAZOLE SODIUM 20 MG/1
40 TABLET, DELAYED RELEASE ORAL DAILY
Refills: 0 | Status: DISCONTINUED | OUTPATIENT
Start: 2024-04-29 | End: 2024-05-02

## 2024-04-29 RX ORDER — SODIUM CHLORIDE 9 MG/ML
1000 INJECTION, SOLUTION INTRAVENOUS ONCE
Refills: 0 | Status: COMPLETED | OUTPATIENT
Start: 2024-04-29 | End: 2024-04-29

## 2024-04-29 RX ORDER — HEPARIN SODIUM 5000 [USP'U]/ML
5000 INJECTION INTRAVENOUS; SUBCUTANEOUS EVERY 8 HOURS
Refills: 0 | Status: DISCONTINUED | OUTPATIENT
Start: 2024-04-29 | End: 2024-04-29

## 2024-04-29 RX ORDER — MAGNESIUM SULFATE 500 MG/ML
1 VIAL (ML) INJECTION ONCE
Refills: 0 | Status: COMPLETED | OUTPATIENT
Start: 2024-04-29 | End: 2024-04-29

## 2024-04-29 RX ORDER — POTASSIUM CHLORIDE 20 MEQ
20 PACKET (EA) ORAL
Refills: 0 | Status: COMPLETED | OUTPATIENT
Start: 2024-04-29 | End: 2024-04-29

## 2024-04-29 RX ADMIN — Medication 30 MILLIGRAM(S): at 05:16

## 2024-04-29 RX ADMIN — SODIUM CHLORIDE 100 MILLILITER(S): 9 INJECTION, SOLUTION INTRAVENOUS at 19:41

## 2024-04-29 RX ADMIN — Medication 100 GRAM(S): at 10:16

## 2024-04-29 RX ADMIN — Medication 50 MILLIEQUIVALENT(S): at 13:59

## 2024-04-29 RX ADMIN — Medication 1000 MILLIGRAM(S): at 09:56

## 2024-04-29 RX ADMIN — PANTOPRAZOLE SODIUM 40 MILLIGRAM(S): 20 TABLET, DELAYED RELEASE ORAL at 19:47

## 2024-04-29 RX ADMIN — Medication 400 MILLIGRAM(S): at 09:36

## 2024-04-29 RX ADMIN — Medication 30 MILLIGRAM(S): at 04:56

## 2024-04-29 RX ADMIN — Medication 400 MILLIGRAM(S): at 01:25

## 2024-04-29 RX ADMIN — SODIUM CHLORIDE 1000 MILLILITER(S): 9 INJECTION, SOLUTION INTRAVENOUS at 11:36

## 2024-04-29 RX ADMIN — HEPARIN SODIUM 5000 UNIT(S): 5000 INJECTION INTRAVENOUS; SUBCUTANEOUS at 13:59

## 2024-04-29 RX ADMIN — ONDANSETRON 8 MILLIGRAM(S): 8 TABLET, FILM COATED ORAL at 04:56

## 2024-04-29 RX ADMIN — Medication 50 MILLIEQUIVALENT(S): at 11:35

## 2024-04-29 RX ADMIN — Medication 1000 MILLIGRAM(S): at 01:40

## 2024-04-29 NOTE — PROGRESS NOTE ADULT - SUBJECTIVE AND OBJECTIVE BOX
Patient evaluated at bedside. Endorses continued nausea, vomiting, and peristaltic abdominal pain. Not passing flatus since 2 days ago.      T(C): 36.9 (04-29-24 @ 05:34), Max: 37.3 (04-28-24 @ 19:52)  HR: 98 (04-29-24 @ 05:34) (88 - 98)  BP: 136/79 (04-29-24 @ 05:34) (126/78 - 146/84)  RR: 18 (04-29-24 @ 05:34) (16 - 18)  SpO2: 97% (04-29-24 @ 05:34) (97% - 98%)  Wt(kg): --    Gen: Well-appearing. NAD.  Resp: Breathing comfortably on RA.  Abd: Soft, minimally TTP LLQ, mildly distended, +BS, hyepractive  Ext: No calf tenderness        04-28 @ 07:01  -  04-29 @ 07:00  --------------------------------------------------------  IN: 1000 mL / OUT: 650 mL / NET: 350 mL            acetaminophen   IVPB .. 1000 milliGRAM(s) IV Intermittent once PRN  diphenhydrAMINE Injectable 50 milliGRAM(s) IV Push at bedtime PRN  ketorolac   Injectable 30 milliGRAM(s) IV Push every 6 hours PRN  lactated ringers. 1000 milliLiter(s) IV Continuous <Continuous>  metoclopramide Injectable 10 milliGRAM(s) IV Push every 6 hours PRN  ondansetron Injectable 8 milliGRAM(s) IV Push every 6 hours PRN  pantoprazole  Injectable 40 milliGRAM(s) IV Push daily

## 2024-04-29 NOTE — CONSULT NOTE ADULT - ASSESSMENT
59 year old female with a PMHx of myeloma (on PO and IV chemotherapy) and PSHx of anterior cervical spinal fusion now s/p laparoscopic hysterectomy with extensive EDEL on 4/19/24 recently seen by general surgery for high grade SBO 4/23 managed with NGT decompression discharged on 4/26 now returns to ED with n/v. Afebrile, nontachycardic, slightly hypertensive to 140s. On exam, abdomen is soft, nondistended, mild mid-abdominal ttp without rebound or guarding. Labs show WBC 9.63, H/H 14/42.8, chemsitry unremarkable. CT AP demonstrates high-grade small bowel obstruction with transition point in the central pelvis and a second possible transition point in the right hemiabdomen.    Presentation consistent with high grade small bowel obstruction  Recommend bowel rest, IVF, repletion of NGT output PRN  NGT placed - CXR pending  NGT to LIWS  Maintain K>4, Mg>2, Phos>3  Please ensure patient is ambulating regular, discussed with patient who expressed understanding  General surgery team 4 will continue to follow, please reach out with any questions

## 2024-04-29 NOTE — PROGRESS NOTE ADULT - NSPROGADDITIONALINFOA_GEN_ALL_CORE
Gyn attg:    Delayed entry. Patient seen and examined 4/29/24 at 1pm. Abdominal pain and nausea continued despite NGT. Reviewed plan with general surgery. Will have diagnostic laparoscopy, possible laparotomy. I reviewed the risk with patient for possible need for bowel resection, and ostomy. Risk of anastomosis leakage, bleeding and infection. All questions and concerns addressed, I informed her that I will be present during her procedure but will not be the one operating.     Pedro Marin MD

## 2024-04-29 NOTE — PROGRESS NOTE ADULT - SUBJECTIVE AND OBJECTIVE BOX
GYN POC    Pt seen and examined at bedside. Pt complains of no abdominal pain.   Pt denies any fever, chills, chest pain, SOB, nausea or vomiting     T(F): 97.3 (04-29-24 @ 19:00), Max: 100.4 (04-29-24 @ 08:50)  HR: 86 (04-29-24 @ 20:05) (86 - 112)  BP: 99/54 (04-29-24 @ 20:05) (93/52 - 143/89)  RR: 10 (04-29-24 @ 20:05) (10 - 19)  SpO2: 99% (04-29-24 @ 20:05) (97% - 100%)  Wt(kg): --    04-28 @ 07:01  -  04-29 @ 07:00  --------------------------------------------------------  IN: 1000 mL / OUT: 650 mL / NET: 350 mL    04-29 @ 07:01  -  04-29 @ 21:59  --------------------------------------------------------  IN: 2180 mL / OUT: 400 mL / NET: 1780 mL        acetaminophen   IVPB .. 1000 milliGRAM(s) IV Intermittent once PRN Mild Pain (1 - 3), Moderate Pain (4 - 6)  heparin   Injectable 5000 Unit(s) SubCutaneous every 8 hours  lactated ringers. 1000 milliLiter(s) IV Continuous <Continuous>  pantoprazole  Injectable 40 milliGRAM(s) IV Push daily      Physical exam:  Constitutional: NAD  Pulmonary: no increased WOB  Abdomen: incision site clean, dry and intact. Soft, mildly tender, nondistended  Extremities: no lower extremity edema, or calf tenderness. SCDs in place   GYN POC    Pt seen and examined at bedside. Pt complains of some abdominal pain.   Pt denies any fever, chills, chest pain, SOB, nausea or vomiting     T(F): 97.3 (04-29-24 @ 19:00), Max: 100.4 (04-29-24 @ 08:50)  HR: 86 (04-29-24 @ 20:05) (86 - 112)  BP: 99/54 (04-29-24 @ 20:05) (93/52 - 143/89)  RR: 10 (04-29-24 @ 20:05) (10 - 19)  SpO2: 99% (04-29-24 @ 20:05) (97% - 100%)  Wt(kg): --    04-28 @ 07:01  -  04-29 @ 07:00  --------------------------------------------------------  IN: 1000 mL / OUT: 650 mL / NET: 350 mL    04-29 @ 07:01  -  04-29 @ 21:59  --------------------------------------------------------  IN: 2180 mL / OUT: 400 mL / NET: 1780 mL        acetaminophen   IVPB .. 1000 milliGRAM(s) IV Intermittent once PRN Mild Pain (1 - 3), Moderate Pain (4 - 6)  heparin   Injectable 5000 Unit(s) SubCutaneous every 8 hours  lactated ringers. 1000 milliLiter(s) IV Continuous <Continuous>  pantoprazole  Injectable 40 milliGRAM(s) IV Push daily      Physical exam:  Constitutional: NAD  Pulmonary: no increased WOB  Abdomen: incision site clean, dry and intact. Soft, mildly tender, nondistended  Extremities: no lower extremity edema, or calf tenderness. SCDs in place

## 2024-04-29 NOTE — PATIENT PROFILE ADULT - FALL HARM RISK - HARM RISK INTERVENTIONS

## 2024-04-29 NOTE — CHART NOTE - NSCHARTNOTEFT_GEN_A_CORE
Patient operated on by general surgery - see their op note for details. Per Dr. Santiago, patient will be transferred to their service postoperatively for continued management.    Talita Mayo MD PGY4  ROMULO Marin MD

## 2024-04-29 NOTE — BRIEF OPERATIVE NOTE - NSICDXBRIEFPROCEDURE_GEN_ALL_CORE_FT
PROCEDURES:  Diagnostic laparoscopy 29-Apr-2024 18:42:26  Telly De Dios  Lysis, adhesions, intestine 29-Apr-2024 18:42:32  Telly De Dios  Lap appendectomy 29-Apr-2024 18:42:37  Telly De Dios

## 2024-04-29 NOTE — CHART NOTE - NSCHARTNOTEFT_GEN_A_CORE
58yo F, known to service, POD 9 s/p TLH BS (Tania), post-op course c/b High grade SBO, requiring readmission (4/23-4/26) managed nonoperatively w NGT decompression + MELLY, who represents to ER this evening with 12hrs of abd pain w associated nausea, emesis, and PO intolerance. Last BM, flatus early this AM. HD w/nl, abd soft, ND, TTP in epigastrium w/o R/G/signs of peritonitis, incisions c/d/i. WBC 9.63, lactate 1.2. CT A/P w high grade SBO in central pelvis, ?second TP in R hemiabdomen?, 3x3.6cm hypodensity in R hepatic lobe. After review of imaging w on call ACS attending, no discrete second TP appreciated, abdominopelvic TP appreciated and appears similar to imaging obtained during previous admission. Endorses waxing/waning pain pattern. Low concern for closed loop process at this time, will place NGT and reevaluate shortly after placement/OOBA. If fails to progress clinically, will likely require operative intervention.

## 2024-04-29 NOTE — BRIEF OPERATIVE NOTE - OPERATION/FINDINGS
Delilah cutdown at previous umbilical site. 5mm ports placed at LLQ and RLQ previous port sites, additional LLQ port. Diagnostic lap revealing dilated bowel and transition point reduced. Appendix and multiple loops of small bowel adhesed in pelvis. EDEL with sharp dissection. Bowel run from LOT to TI without evidence of perforation or serosal team. Appendix mesentary taken with ligasure. Base amputated with EndoGIA purple. Fascia closed with 0 vicryl.

## 2024-04-29 NOTE — PROGRESS NOTE ADULT - SUBJECTIVE AND OBJECTIVE BOX
Procedure: Diagnostic laparoscopy, Lysis, adhesions, intestine, Lap appendectomy    Surgeon: Dr. Santiago    Subjective: Pt doing well in the post op period. She denies abdominal pain. Denies n/v. Denies cp/sob. Denies f/bm. Denies f/c.    Vital Signs Last 24 Hrs  T(C): 36.3 (29 Apr 2024 19:00), Max: 38 (29 Apr 2024 08:50)  T(F): 97.3 (29 Apr 2024 19:00), Max: 100.4 (29 Apr 2024 08:50)  HR: 86 (29 Apr 2024 20:05) (86 - 112)  BP: 99/54 (29 Apr 2024 20:05) (93/52 - 143/89)  BP(mean): 71 (29 Apr 2024 20:05) (68 - 98)  RR: 10 (29 Apr 2024 20:05) (10 - 19)  SpO2: 99% (29 Apr 2024 20:05) (97% - 100%)    Parameters below as of 29 Apr 2024 20:05  Patient On (Oxygen Delivery Method): nasal cannula  O2 Flow (L/min): 3      Physical Exam:  General: NAD, resting comfortably in bed  Pulmonary: Nonlabored breathing, no respiratory distress  Cardiovascular: NSR  Abdominal: soft, appropriately TTP, nondistended, incisions clean/dry/intact  Extremities: WWP, normal strength  Neuro: A/O x 3, CNs II-XII grossly intact, no focal deficits    Assessment:59y Female s/p Diagnostic laparoscopy    Lysis, adhesions, intestine    Lap appendectomy        Plan:  NPO/NGT  Pain control  Cm  OOBAT/IS  PPI  AM labs

## 2024-04-29 NOTE — PROGRESS NOTE ADULT - SUBJECTIVE AND OBJECTIVE BOX
SUBJECTIVE:    Patient admitted overnight for recurrent High grade SBO   Persistently nauseaus, one episode of vomiting overnight  Low grade temp Tmax 100.4- tachycardic with resolution after tylenol-   NGT in place with 200cc output   No bowel function reported       MEDICATIONS  (STANDING):  heparin   Injectable 5000 Unit(s) SubCutaneous every 8 hours  lactated ringers. 1000 milliLiter(s) (125 mL/Hr) IV Continuous <Continuous>  magnesium sulfate  IVPB 1 Gram(s) IV Intermittent once  pantoprazole  Injectable 40 milliGRAM(s) IV Push daily  potassium chloride  20 mEq/100 mL IVPB 20 milliEquivalent(s) IV Intermittent every 2 hours    MEDICATIONS  (PRN):  diphenhydrAMINE Injectable 50 milliGRAM(s) IV Push at bedtime PRN Insomnia  ketorolac   Injectable 30 milliGRAM(s) IV Push every 6 hours PRN Moderate Pain (4 - 6)  metoclopramide Injectable 10 milliGRAM(s) IV Push every 6 hours PRN nausea/vomiting  ondansetron Injectable 8 milliGRAM(s) IV Push every 6 hours PRN Nausea and/or Vomiting      Vital Signs Last 24 Hrs  T(C): 37.2 (29 Apr 2024 10:02), Max: 38 (29 Apr 2024 08:50)  T(F): 98.9 (29 Apr 2024 10:02), Max: 100.4 (29 Apr 2024 08:50)  HR: 112 (29 Apr 2024 10:02) (88 - 112)  BP: 134/80 (29 Apr 2024 08:50) (126/78 - 146/84)  BP(mean): 98 (29 Apr 2024 05:34) (98 - 98)  RR: 19 (29 Apr 2024 08:50) (16 - 19)  SpO2: 98% (29 Apr 2024 08:50) (97% - 98%)    Parameters below as of 29 Apr 2024 08:50  Patient On (Oxygen Delivery Method): room air        Physical Exam:  General: AAOx3, NAD, laying comfortably in bed  Cardio: S1,S2, No MRG  Pulm: Nonlabored breathing  Abdomen: soft, mildly distented, tender un suprapubic area, mildly - appropriate healing laparoscopic incisions   Extremities: WWP, peripheral pulses appreciated      I&O's Summary    28 Apr 2024 07:01  -  29 Apr 2024 07:00  --------------------------------------------------------  IN: 1000 mL / OUT: 650 mL / NET: 350 mL    29 Apr 2024 07:01  -  29 Apr 2024 10:15  --------------------------------------------------------  IN: 350 mL / OUT: 200 mL / NET: 150 mL        LABS:                        12.9   3.08  )-----------( 189      ( 29 Apr 2024 05:30 )             40.0     04-29    140  |  105  |  11  ----------------------------<  113<H>  3.3<L>   |  21<L>  |  0.74    Ca    9.2      29 Apr 2024 05:30  Phos  3.6     04-29  Mg     1.9     04-29    TPro  6.3  /  Alb  3.9  /  TBili  0.4  /  DBili  x   /  AST  26  /  ALT  21  /  AlkPhos  83  04-28      Urinalysis Basic - ( 29 Apr 2024 05:30 )    Color: x / Appearance: x / SG: x / pH: x  Gluc: 113 mg/dL / Ketone: x  / Bili: x / Urobili: x   Blood: x / Protein: x / Nitrite: x   Leuk Esterase: x / RBC: x / WBC x   Sq Epi: x / Non Sq Epi: x / Bacteria: x      CAPILLARY BLOOD GLUCOSE        LIVER FUNCTIONS - ( 28 Apr 2024 22:11 )  Alb: 3.9 g/dL / Pro: 6.3 g/dL / ALK PHOS: 83 U/L / ALT: 21 U/L / AST: 26 U/L / GGT: x             RADIOLOGY & ADDITIONAL STUDIES:

## 2024-04-30 LAB
ANION GAP SERPL CALC-SCNC: 12 MMOL/L — SIGNIFICANT CHANGE UP (ref 5–17)
ANION GAP SERPL CALC-SCNC: 13 MMOL/L — SIGNIFICANT CHANGE UP (ref 5–17)
ANISOCYTOSIS BLD QL: SLIGHT — SIGNIFICANT CHANGE UP
BASOPHILS # BLD AUTO: 0 K/UL — SIGNIFICANT CHANGE UP (ref 0–0.2)
BASOPHILS NFR BLD AUTO: 0 % — SIGNIFICANT CHANGE UP (ref 0–2)
BUN SERPL-MCNC: 14 MG/DL — SIGNIFICANT CHANGE UP (ref 7–23)
BUN SERPL-MCNC: 16 MG/DL — SIGNIFICANT CHANGE UP (ref 7–23)
CALCIUM SERPL-MCNC: 8.5 MG/DL — SIGNIFICANT CHANGE UP (ref 8.4–10.5)
CALCIUM SERPL-MCNC: 8.5 MG/DL — SIGNIFICANT CHANGE UP (ref 8.4–10.5)
CHLORIDE SERPL-SCNC: 103 MMOL/L — SIGNIFICANT CHANGE UP (ref 96–108)
CHLORIDE SERPL-SCNC: 108 MMOL/L — SIGNIFICANT CHANGE UP (ref 96–108)
CO2 SERPL-SCNC: 21 MMOL/L — LOW (ref 22–31)
CO2 SERPL-SCNC: 24 MMOL/L — SIGNIFICANT CHANGE UP (ref 22–31)
CREAT SERPL-MCNC: 0.83 MG/DL — SIGNIFICANT CHANGE UP (ref 0.5–1.3)
CREAT SERPL-MCNC: 0.88 MG/DL — SIGNIFICANT CHANGE UP (ref 0.5–1.3)
CULTURE RESULTS: SIGNIFICANT CHANGE UP
EGFR: 76 ML/MIN/1.73M2 — SIGNIFICANT CHANGE UP
EGFR: 81 ML/MIN/1.73M2 — SIGNIFICANT CHANGE UP
EOSINOPHIL # BLD AUTO: 0 K/UL — SIGNIFICANT CHANGE UP (ref 0–0.5)
EOSINOPHIL NFR BLD AUTO: 0 % — SIGNIFICANT CHANGE UP (ref 0–6)
GLUCOSE SERPL-MCNC: 124 MG/DL — HIGH (ref 70–99)
GLUCOSE SERPL-MCNC: 132 MG/DL — HIGH (ref 70–99)
HCT VFR BLD CALC: 34 % — LOW (ref 34.5–45)
HGB BLD-MCNC: 11 G/DL — LOW (ref 11.5–15.5)
HYPOCHROMIA BLD QL: SLIGHT — SIGNIFICANT CHANGE UP
LYMPHOCYTES # BLD AUTO: 1.35 K/UL — SIGNIFICANT CHANGE UP (ref 1–3.3)
LYMPHOCYTES # BLD AUTO: 16.7 % — SIGNIFICANT CHANGE UP (ref 13–44)
MAGNESIUM SERPL-MCNC: 2 MG/DL — SIGNIFICANT CHANGE UP (ref 1.6–2.6)
MAGNESIUM SERPL-MCNC: 2 MG/DL — SIGNIFICANT CHANGE UP (ref 1.6–2.6)
MANUAL SMEAR VERIFICATION: SIGNIFICANT CHANGE UP
MCHC RBC-ENTMCNC: 31.3 PG — SIGNIFICANT CHANGE UP (ref 27–34)
MCHC RBC-ENTMCNC: 32.4 GM/DL — SIGNIFICANT CHANGE UP (ref 32–36)
MCV RBC AUTO: 96.6 FL — SIGNIFICANT CHANGE UP (ref 80–100)
METAMYELOCYTES # FLD: 2.6 % — HIGH (ref 0–0)
MICROCYTES BLD QL: SLIGHT — SIGNIFICANT CHANGE UP
MONOCYTES # BLD AUTO: 0.57 K/UL — SIGNIFICANT CHANGE UP (ref 0–0.9)
MONOCYTES NFR BLD AUTO: 7 % — SIGNIFICANT CHANGE UP (ref 2–14)
NEUTROPHILS # BLD AUTO: 5.95 K/UL — SIGNIFICANT CHANGE UP (ref 1.8–7.4)
NEUTROPHILS NFR BLD AUTO: 62.3 % — SIGNIFICANT CHANGE UP (ref 43–77)
NEUTS BAND # BLD: 11.4 % — HIGH (ref 0–8)
OVALOCYTES BLD QL SMEAR: SLIGHT — SIGNIFICANT CHANGE UP
PHOSPHATE SERPL-MCNC: 1.1 MG/DL — LOW (ref 2.5–4.5)
PHOSPHATE SERPL-MCNC: 2.1 MG/DL — LOW (ref 2.5–4.5)
PLAT MORPH BLD: ABNORMAL
PLATELET # BLD AUTO: 149 K/UL — LOW (ref 150–400)
POIKILOCYTOSIS BLD QL AUTO: SLIGHT — SIGNIFICANT CHANGE UP
POLYCHROMASIA BLD QL SMEAR: SLIGHT — SIGNIFICANT CHANGE UP
POTASSIUM SERPL-MCNC: 3.1 MMOL/L — LOW (ref 3.5–5.3)
POTASSIUM SERPL-MCNC: 4 MMOL/L — SIGNIFICANT CHANGE UP (ref 3.5–5.3)
POTASSIUM SERPL-SCNC: 3.1 MMOL/L — LOW (ref 3.5–5.3)
POTASSIUM SERPL-SCNC: 4 MMOL/L — SIGNIFICANT CHANGE UP (ref 3.5–5.3)
RBC # BLD: 3.52 M/UL — LOW (ref 3.8–5.2)
RBC # FLD: 15.8 % — HIGH (ref 10.3–14.5)
RBC BLD AUTO: ABNORMAL
SODIUM SERPL-SCNC: 140 MMOL/L — SIGNIFICANT CHANGE UP (ref 135–145)
SODIUM SERPL-SCNC: 141 MMOL/L — SIGNIFICANT CHANGE UP (ref 135–145)
SPECIMEN SOURCE: SIGNIFICANT CHANGE UP
WBC # BLD: 8.08 K/UL — SIGNIFICANT CHANGE UP (ref 3.8–10.5)
WBC # FLD AUTO: 8.08 K/UL — SIGNIFICANT CHANGE UP (ref 3.8–10.5)

## 2024-04-30 PROCEDURE — 99233 SBSQ HOSP IP/OBS HIGH 50: CPT

## 2024-04-30 PROCEDURE — 99232 SBSQ HOSP IP/OBS MODERATE 35: CPT | Mod: 24,GC

## 2024-04-30 RX ORDER — POTASSIUM PHOSPHATE, MONOBASIC POTASSIUM PHOSPHATE, DIBASIC 236; 224 MG/ML; MG/ML
30 INJECTION, SOLUTION INTRAVENOUS ONCE
Refills: 0 | Status: COMPLETED | OUTPATIENT
Start: 2024-04-30 | End: 2024-05-01

## 2024-04-30 RX ORDER — POTASSIUM CHLORIDE 20 MEQ
20 PACKET (EA) ORAL
Refills: 0 | Status: COMPLETED | OUTPATIENT
Start: 2024-04-30 | End: 2024-04-30

## 2024-04-30 RX ORDER — SODIUM CHLORIDE 9 MG/ML
1000 INJECTION, SOLUTION INTRAVENOUS
Refills: 0 | Status: DISCONTINUED | OUTPATIENT
Start: 2024-04-30 | End: 2024-04-30

## 2024-04-30 RX ORDER — SODIUM CHLORIDE 9 MG/ML
500 INJECTION, SOLUTION INTRAVENOUS ONCE
Refills: 0 | Status: COMPLETED | OUTPATIENT
Start: 2024-04-30 | End: 2024-04-30

## 2024-04-30 RX ORDER — ACETAMINOPHEN 500 MG
500 TABLET ORAL EVERY 4 HOURS
Refills: 0 | Status: DISCONTINUED | OUTPATIENT
Start: 2024-04-30 | End: 2024-05-02

## 2024-04-30 RX ORDER — OXYCODONE HYDROCHLORIDE 5 MG/1
2.5 TABLET ORAL ONCE
Refills: 0 | Status: DISCONTINUED | OUTPATIENT
Start: 2024-04-30 | End: 2024-04-30

## 2024-04-30 RX ORDER — BENZOCAINE 10 %
1 GEL (GRAM) MUCOUS MEMBRANE ONCE
Refills: 0 | Status: COMPLETED | OUTPATIENT
Start: 2024-04-30 | End: 2024-04-30

## 2024-04-30 RX ORDER — OXYCODONE HYDROCHLORIDE 5 MG/1
2.5 TABLET ORAL EVERY 4 HOURS
Refills: 0 | Status: DISCONTINUED | OUTPATIENT
Start: 2024-04-30 | End: 2024-05-02

## 2024-04-30 RX ORDER — ACETAMINOPHEN 500 MG
1000 TABLET ORAL ONCE
Refills: 0 | Status: COMPLETED | OUTPATIENT
Start: 2024-04-30 | End: 2024-04-30

## 2024-04-30 RX ORDER — OXYCODONE HYDROCHLORIDE 5 MG/1
5 TABLET ORAL ONCE
Refills: 0 | Status: DISCONTINUED | OUTPATIENT
Start: 2024-04-30 | End: 2024-04-30

## 2024-04-30 RX ORDER — ASPIRIN/CALCIUM CARB/MAGNESIUM 324 MG
81 TABLET ORAL DAILY
Refills: 0 | Status: DISCONTINUED | OUTPATIENT
Start: 2024-05-01 | End: 2024-05-02

## 2024-04-30 RX ORDER — SODIUM CHLORIDE 9 MG/ML
1000 INJECTION, SOLUTION INTRAVENOUS
Refills: 0 | Status: DISCONTINUED | OUTPATIENT
Start: 2024-04-30 | End: 2024-05-01

## 2024-04-30 RX ORDER — OXYCODONE HYDROCHLORIDE 5 MG/1
5 TABLET ORAL EVERY 4 HOURS
Refills: 0 | Status: DISCONTINUED | OUTPATIENT
Start: 2024-04-30 | End: 2024-05-02

## 2024-04-30 RX ADMIN — SODIUM CHLORIDE 40 MILLILITER(S): 9 INJECTION, SOLUTION INTRAVENOUS at 21:30

## 2024-04-30 RX ADMIN — HEPARIN SODIUM 5000 UNIT(S): 5000 INJECTION INTRAVENOUS; SUBCUTANEOUS at 23:10

## 2024-04-30 RX ADMIN — Medication 400 MILLIGRAM(S): at 07:04

## 2024-04-30 RX ADMIN — SODIUM CHLORIDE 140 MILLILITER(S): 9 INJECTION, SOLUTION INTRAVENOUS at 14:36

## 2024-04-30 RX ADMIN — SODIUM CHLORIDE 140 MILLILITER(S): 9 INJECTION, SOLUTION INTRAVENOUS at 07:34

## 2024-04-30 RX ADMIN — Medication 400 MILLIGRAM(S): at 22:14

## 2024-04-30 RX ADMIN — Medication 85 MILLIMOLE(S): at 10:48

## 2024-04-30 RX ADMIN — PANTOPRAZOLE SODIUM 40 MILLIGRAM(S): 20 TABLET, DELAYED RELEASE ORAL at 19:27

## 2024-04-30 RX ADMIN — SODIUM CHLORIDE 1000 MILLILITER(S): 9 INJECTION, SOLUTION INTRAVENOUS at 03:31

## 2024-04-30 RX ADMIN — Medication 50 MILLIEQUIVALENT(S): at 21:28

## 2024-04-30 RX ADMIN — HEPARIN SODIUM 5000 UNIT(S): 5000 INJECTION INTRAVENOUS; SUBCUTANEOUS at 00:09

## 2024-04-30 RX ADMIN — Medication 1000 MILLIGRAM(S): at 07:34

## 2024-04-30 RX ADMIN — HEPARIN SODIUM 5000 UNIT(S): 5000 INJECTION INTRAVENOUS; SUBCUTANEOUS at 07:00

## 2024-04-30 RX ADMIN — SODIUM CHLORIDE 100 MILLILITER(S): 9 INJECTION, SOLUTION INTRAVENOUS at 07:00

## 2024-04-30 RX ADMIN — Medication 1000 MILLIGRAM(S): at 22:44

## 2024-04-30 RX ADMIN — Medication 50 MILLIEQUIVALENT(S): at 18:28

## 2024-04-30 RX ADMIN — Medication 400 MILLIGRAM(S): at 14:36

## 2024-04-30 RX ADMIN — SODIUM CHLORIDE 100 MILLILITER(S): 9 INJECTION, SOLUTION INTRAVENOUS at 04:01

## 2024-04-30 RX ADMIN — HEPARIN SODIUM 5000 UNIT(S): 5000 INJECTION INTRAVENOUS; SUBCUTANEOUS at 15:08

## 2024-04-30 RX ADMIN — Medication 1000 MILLIGRAM(S): at 14:56

## 2024-04-30 NOTE — CONSULT NOTE ADULT - SUBJECTIVE AND OBJECTIVE BOX
60 yo P0 POD9 s/p [facility] BS, s/p admission 4/23-26 for high grade SBO, now presenting for nausea, vomiting, and abdominal pain. Around 2pm today she started to feel lower abdominal pain, which progress to be mostly focused in the upper abdomen. An hour later she started to have vomiting and has had greater than 10 episodes. She is unable to hold down any food or water. It feels similar to her prior episode. She had a small bowel movement this morning. Before today, she had felt well after discharge and was able to eat and drink normally.   Pt denies fever, chills, chest pain, SOB, vaginal bleeding, vaginal discharge, dysuria.       OB/GYN Hx: G1 - ectopic pregnancy.   PMHx: multiple myeloma, stopped medications 4/4 prior to surgery  SHx: UAE, L/S salpingectomy, cervical vertebral fusion, 4/19/24 [facility] BS  Meds: reglamid  10 mg, daratumumab, ASA 81, benadryl for insomnia  Allergies: NKDA:     PHYSICAL EXAM:   Vital Signs Last 24 Hrs  T(C): 36.9 (28 Apr 2024 21:39), Max: 37.3 (28 Apr 2024 19:52)  T(F): 98.4 (28 Apr 2024 21:39), Max: 99.1 (28 Apr 2024 19:52)  HR: 88 (28 Apr 2024 21:39) (88 - 95)  BP: 126/78 (28 Apr 2024 21:39) (126/78 - 146/84)  BP(mean): --  RR: 18 (28 Apr 2024 21:39) (16 - 18)  SpO2: 98% (28 Apr 2024 21:39) (98% - 98%)    Parameters below as of 28 Apr 2024 21:39  Patient On (Oxygen Delivery Method): room air        **************************  Constitutional: Alert & Oriented x3, No acute distress  Respiratory: no increased WOB  Gastrointestinal: moderately firm, tender globally however most pronounced in upper abdomen, moderately distended, voluntary guarding  Extremities: no calf tenderness or swelling    LABS:                        14.0   9.63  )-----------( 201      ( 28 Apr 2024 20:10 )             42.8     04-28    141  |  102  |  8   ----------------------------<  112<H>  See Note   |  20<L>  |  0.77    Ca    10.0      28 Apr 2024 20:10    TPro  7.1  /  Alb  4.3  /  TBili  0.5  /  DBili  x   /  AST  See Note  /  ALT  See Note  /  AlkPhos  See Note  04-28      Urinalysis Basic - ( 28 Apr 2024 20:10 )    Color: x / Appearance: x / SG: x / pH: x  Gluc: 112 mg/dL / Ketone: x  / Bili: x / Urobili: x   Blood: x / Protein: x / Nitrite: x   Leuk Esterase: x / RBC: x / WBC x   Sq Epi: x / Non Sq Epi: x / Bacteria: x          RADIOLOGY & ADDITIONAL STUDIES:  < from: CT Abdomen and Pelvis w/ IV Cont (04.23.24 @ 10:26) >  INTERPRETATION:  CLINICAL INFORMATION: Abdominal pain and vomiting status   post hysterectomy 4 days previously    COMPARISON: None.    CONTRAST/COMPLICATIONS:  IV Contrast: Omnipaque 350  90 cc administered   10 cc discarded  Oral Contrast: NONE  Complications: None reported at time of study completion    PROCEDURE:  CT of the Abdomen and Pelvis was performed.  Sagittal and coronal reformats were performed.    FINDINGS:  LOWER CHEST: Nonspecific bilateral breast skin thickening.    LIVER: Indeterminate peripheral 3.6 x 3.0 cm hypoattenuating segment 8/5   hepatic lesion associated with capsular retraction.  BILE DUCTS: Normal caliber.  GALLBLADDER: Within normal limits.  SPLEEN: Within normal limits.  PANCREAS: Within normal limits.  ADRENALS: Within normal limits.  KIDNEYS/URETERS: Within normal limits.    BLADDER: Within normal limits.  REPRODUCTIVE ORGANS: Hysterectomy.    BOWEL: High-grade small bowel obstruction, upper transition point in the   central pelvis (2, 115) where there is incomplete twisting of the   mesentery. Mild upstream small bowel obstruction and complete downstream   collapse.  Appendix not identified.  PERITONEUM: Small volume slightly hyperdense ascites and mesenteric fluid.  VESSELS: Atherosclerotic changes.  RETROPERITONEUM/LYMPH NODES: No lymphadenopathy.  ABDOMINAL WALL: Minimal postoperative changes  BONES: Numerous small lucencies scattered throughout the visualized   osseous structures.    IMPRESSION:  1.  High-grade small bowel obstruction associated with small volume   hyperdense ascites and mesenteric fluid. Consider possibility of   developing ischemia.  2.  Indeterminate 3.6 cm liver lesion, advise MRI with and without   contrast for further assessment.  3.  Numerous small lucencies scattered throughout the visualized osseous   structures. Consider heterogeneous osteopenia, myeloma, or metastatic   disease. Further workup as clinically directed and may include MRI of the   spine and/or pelvis.    < end of copied text >  
59 year old female with a PMHx of myeloma (on PO and IV chemotherapy) and PSHx of anterior cervical spinal fusion now s/p laparoscopic hysterectomy with extensive EDEL on 4/19/24 recently seen by general surgery for high grade SBO 4/23 managed with NGT decompression discharged on 4/26 now returns to ED with n/v. Patient states that she experience pain in the pelvis earlier this morning however thought she needed to defecate, passed small insignficant BM. Since then, pain has become belt-like, sharp, cramping. Had 10+ episodes of nausea at home with return of gastric contents, has not passed flatus or had a bowel movement since this morning. Subjective fevers/chills, denies melena/hematochezia.    Last colonoscopy/EGD - 5 years ago without pathologic findings, cannot recall why she had an EGD.  Denies family hx of IBS, Crohn's, UC, or colon cancer.    Surgery consulted for r/o SBO.     In the ED, pt afebrile, nontachycardic, slightly hypertensive to 140s. On exam, abdomen is soft, nondistended, mild mid-abdominal ttp without rebound or guarding. Labs show WBC 9.63, H/H 14/42.8, chemsitry unremarkable. CT AP demonstrates high-grade small bowel obstruction with transition point in the central pelvis and a second possible transition point in the right hemiabdomen.    PMHx - myeloma (s/p PO and IV chemotherapy) , fibroids  PSHx - anterior cervical spinal fusion now s/p laparoscopic hysterectomy with extensive EDEL  Meds -  reglamid 10 mg, daratumumab, ASA 81, benadryl for insomnia    VITALS  T(C): 36.9 (04-28-24 @ 21:39), Max: 37.3 (04-28-24 @ 19:52)  HR: 88 (04-28-24 @ 21:39) (88 - 95)  BP: 126/78 (04-28-24 @ 21:39) (126/78 - 146/84)  RR: 18 (04-28-24 @ 21:39) (16 - 18)  SpO2: 98% (04-28-24 @ 21:39) (98% - 98%)    Physical Exam  General: AAOx3, NAD, laying comfortably in bed  Cardio: S1,S2, No MRG  Pulm: Nonlabored breathing  Abdomen:  Extremities: WWP, peripheral pulses appreciated    LABS:                        14.0   9.63  )-----------( 201      ( 28 Apr 2024 20:10 )             42.8     04-28    141  |  102  |  8   ----------------------------<  112<H>  See Note   |  20<L>  |  0.77    Ca    10.0      28 Apr 2024 20:10    TPro  7.1  /  Alb  4.3  /  TBili  0.5  /  DBili  x   /  AST  See Note  /  ALT  See Note  /  AlkPhos  See Note  04-28      IMAGING  ACC: [id number] EXAM:  CT ABDOMEN AND PELVIS IC   ORDERED BY: JOSE DAVIS     PROCEDURE DATE:  04/28/2024          INTERPRETATION:  CLINICAL INFORMATION: Vomiting. Concern for recurrent   small bowel obstruction.    COMPARISON: CT abdomen and pelvis dated 4/23/2024    CONTRAST/COMPLICATIONS:  IV Contrast: Isovue 370  90 cc administered   10 cc discarded  Oral Contrast: NONE  Complications: None reported at time of study completion    PROCEDURE:  CT of the Abdomen and Pelvis was performed.  Sagittal and coronal reformats were performed.    FINDINGS:  LOWER CHEST: Within normal limits.    LIVER: Redemonstrated right hepatic 3.0 x 3.6 cm hypodensity with   capsular retraction.  BILE DUCTS: Normal caliber.  GALLBLADDER: Within normal limits.  SPLEEN: Within normal limits.  PANCREAS: Within normal limits.  ADRENALS: Within normal limits.  KIDNEYS/URETERS: Within normal limits.    BLADDER: Within normal limits.  REPRODUCTIVE ORGANS: Hysterectomy.    BOWEL: Multiple loops of dilated small bowel, measuring up to 3.3 cm,   with air-fluid levels. There is a transition point in the central pelvis   (series 101 image 74 and series 6 image 53). A second possible transition   point is demonstrated in the right hemiabdomen (series 101 image 41 and   series 5 image 107). Appendix is normal.  PERITONEUM: Small volume simple pelvic ascites.  VESSELS: Within normal limits.  RETROPERITONEUM/LYMPH NODES: No lymphadenopathy.  ABDOMINAL WALL: Within normal limits.  BONES: Redemonstrated scattered lucent lesions throughout the visualized   osseous structures, for example measuring 1.2 x 1.3 cm in the L4   vertebral body.    IMPRESSION:  1.  High-grade small bowel obstruction with transition point in the   central pelvis and a second possible transition point in the right   hemiabdomen. Closed loop obstruction cannot be excluded.  2.  Redemonstrated right hepatic 3.0 x 3.6 cm hypodensity with capsular   retraction. Limited differential includes hemangioma, hepatic abscess,   and malignancy, including cholangiocarcinoma. Again, further evaluation   with nonemergent hepatic MRI is recommended.  3.  Redemonstrated scattered lucent lesions throughout the visualized   osseous structures. Again, nonemergent spine and/or pelvis MRI can be   considered.            
Patient is a 59y old  Female who presents with a chief complaint of     HPI:  60 yo P0 POD9 s/p [facility] BS, s/p admission 4/23-26 for high grade SBO, now presenting for nausea, vomiting, and abdominal pain. Around 2pm today she started to feel lower abdominal pain, which progress to be mostly focused in the upper abdomen. An hour later she started to have vomiting and has had greater than 10 episodes. She is unable to hold down any food or water. It feels similar to her prior episode. She had a small bowel movement this morning. Before today, she had felt well after discharge and was able to eat and drink normally.   Pt denies fever, chills, chest pain, SOB, vaginal bleeding, vaginal discharge, dysuria.       OB/GYN Hx: G1 - ectopic pregnancy.   PMHx: multiple myeloma, stopped medications 4/4 prior to surgery  SHx: UAE, L/S salpingectomy, cervical vertebral fusion, 4/19/24 [facility] BS  Meds: reglamid  10 mg, daratumumab, ASA 81, benadryl for insomnia  Allergies: NKDA:     PHYSICAL EXAM:   Vital Signs Last 24 Hrs  T(C): 36.9 (28 Apr 2024 21:39), Max: 37.3 (28 Apr 2024 19:52)  T(F): 98.4 (28 Apr 2024 21:39), Max: 99.1 (28 Apr 2024 19:52)  HR: 88 (28 Apr 2024 21:39) (88 - 95)  BP: 126/78 (28 Apr 2024 21:39) (126/78 - 146/84)  BP(mean): --  RR: 18 (28 Apr 2024 21:39) (16 - 18)  SpO2: 98% (28 Apr 2024 21:39) (98% - 98%)    Parameters below as of 28 Apr 2024 21:39  Patient On (Oxygen Delivery Method): room air        **************************  Constitutional: Alert & Oriented x3, No acute distress  Respiratory: no increased WOB  Gastrointestinal: moderately firm, tender globally however most pronounced in upper abdomen, moderately distended, voluntary guarding  Extremities: no calf tenderness or swelling    LABS:                        14.0   9.63  )-----------( 201      ( 28 Apr 2024 20:10 )             42.8     04-28    141  |  102  |  8   ----------------------------<  112<H>  See Note   |  20<L>  |  0.77    Ca    10.0      28 Apr 2024 20:10    TPro  7.1  /  Alb  4.3  /  TBili  0.5  /  DBili  x   /  AST  See Note  /  ALT  See Note  /  AlkPhos  See Note  04-28      Urinalysis Basic - ( 28 Apr 2024 20:10 )    Color: x / Appearance: x / SG: x / pH: x  Gluc: 112 mg/dL / Ketone: x  / Bili: x / Urobili: x   Blood: x / Protein: x / Nitrite: x   Leuk Esterase: x / RBC: x / WBC x   Sq Epi: x / Non Sq Epi: x / Bacteria: x          RADIOLOGY & ADDITIONAL STUDIES:  < from: CT Abdomen and Pelvis w/ IV Cont (04.23.24 @ 10:26) >  INTERPRETATION:  CLINICAL INFORMATION: Abdominal pain and vomiting status   post hysterectomy 4 days previously    COMPARISON: None.    CONTRAST/COMPLICATIONS:  IV Contrast: Omnipaque 350  90 cc administered   10 cc discarded  Oral Contrast: NONE  Complications: None reported at time of study completion    PROCEDURE:  CT of the Abdomen and Pelvis was performed.  Sagittal and coronal reformats were performed.    FINDINGS:  LOWER CHEST: Nonspecific bilateral breast skin thickening.    LIVER: Indeterminate peripheral 3.6 x 3.0 cm hypoattenuating segment 8/5   hepatic lesion associated with capsular retraction.  BILE DUCTS: Normal caliber.  GALLBLADDER: Within normal limits.  SPLEEN: Within normal limits.  PANCREAS: Within normal limits.  ADRENALS: Within normal limits.  KIDNEYS/URETERS: Within normal limits.    BLADDER: Within normal limits.  REPRODUCTIVE ORGANS: Hysterectomy.    BOWEL: High-grade small bowel obstruction, upper transition point in the   central pelvis (2, 115) where there is incomplete twisting of the   mesentery. Mild upstream small bowel obstruction and complete downstream   collapse.  Appendix not identified.  PERITONEUM: Small volume slightly hyperdense ascites and mesenteric fluid.  VESSELS: Atherosclerotic changes.  RETROPERITONEUM/LYMPH NODES: No lymphadenopathy.  ABDOMINAL WALL: Minimal postoperative changes  BONES: Numerous small lucencies scattered throughout the visualized   osseous structures.        < end of copied text >   (28 Apr 2024 22:49)      Allergies    No Known Allergies    Intolerances        MEDICATIONS  (STANDING):  dextrose 5% + sodium chloride 0.45%. 1000 milliLiter(s) (140 mL/Hr) IV Continuous <Continuous>  heparin   Injectable 5000 Unit(s) SubCutaneous every 8 hours  pantoprazole  Injectable 40 milliGRAM(s) IV Push daily    MEDICATIONS  (PRN):      Daily Height in cm: 175.26 (29 Apr 2024 15:03)    Daily     Drug Dosing Weight  Height (cm): 175.3 (29 Apr 2024 15:03)  Weight (kg): 93 (29 Apr 2024 15:03)  BMI (kg/m2): 30.3 (29 Apr 2024 15:03)  BSA (m2): 2.09 (29 Apr 2024 15:03)    PAST MEDICAL & SURGICAL HISTORY:  HLD (hyperlipidemia)      Multiple myeloma      S/P thyroid biopsy      History of ectopic pregnancy      H/O foot surgery  B/L      H/O cervical spine surgery  Titanium Cage C4-C6          FAMILY HISTORY:        REVIEW OF SYSTEMS:    CONSTITUTIONAL: No fever, weight loss, or fatigue  EYES: No eye pain, visual disturbances, or discharge  ENMT:  No difficulty hearing, tinnitus, vertigo; No sinus or throat pain  NECK: No pain or stiffness  RESPIRATORY: No cough, wheezing, chills or hemoptysis; No shortness of breath  CARDIOVASCULAR: No chest pain, palpitations, dizziness, or leg swelling  GASTROINTESTINAL: + pain. No nausea, vomiting,    GENITOURINARY: No dysuria, frequency, hematuria, or incontinence  LYMPH NODES: No enlarged glands  ENDOCRINE: No heat or cold intolerance; No hair loss  MUSCULOSKELETAL: No joint pain or swelling; No muscle, back, or extremity pain  SKIN: No itching, burning, rashes, or lesion              Vital Signs Last 24 Hrs  T(C): 36.7 (30 Apr 2024 09:05), Max: 37.3 (30 Apr 2024 04:42)  T(F): 98 (30 Apr 2024 09:05), Max: 99.1 (30 Apr 2024 04:42)  HR: 91 (30 Apr 2024 09:05) (86 - 108)  BP: 113/69 (30 Apr 2024 09:05) (93/52 - 121/71)  BP(mean): 71 (29 Apr 2024 20:05) (68 - 98)  ABP: --  ABP(mean): --  RR: 17 (30 Apr 2024 09:05) (10 - 18)  SpO2: 98% (30 Apr 2024 09:05) (97% - 100%)    O2 Parameters below as of 30 Apr 2024 09:05  Patient On (Oxygen Delivery Method): room air                I&O's Detail    29 Apr 2024 07:01  -  30 Apr 2024 07:00  --------------------------------------------------------  IN:    IV PiggyBack: 100 mL    IV PiggyBack: 100 mL    IV PiggyBack: 130 mL    Lactated Ringers: 750 mL    Lactated Ringers: 1000 mL    Lactated Ringers Bolus: 1000 mL  Total IN: 3080 mL    OUT:    Indwelling Catheter - Urethral (mL): 600 mL    Nasogastric/Oral tube (mL): 50 mL    Oral Fluid: 0 mL    Voided (mL): 300 mL  Total OUT: 950 mL    Total NET: 2130 mL      30 Apr 2024 07:01  -  30 Apr 2024 12:27  --------------------------------------------------------  IN:    dextrose 5% + sodium chloride 0.45%: 420 mL  Total IN: 420 mL    OUT:    Oral Fluid: 0 mL  Total OUT: 0 mL    Total NET: 420 mL          PHYSICAL EXAM:    GENERAL: NAD, well-groomed, well-developed  HEAD:  Atraumatic, Normocephalic  EYES: EOMI, PERRLA, conjunctiva and sclera clear  ENMT: No tonsillar erythema, exudates, or enlargement; Moist mucous membranes, Good dentition, No lesions  NECK: Supple, No JVD, Normal thyroid  NERVOUS SYSTEM:  Alert & Oriented X3, Good concentration; Motor Strength 5/5 B/L upper and lower extremities; DTRs 2+ intact and symmetric  CHEST/LUNG: Clear to percussion bilaterally; No rales, rhonchi, wheezing, or rubs  HEART: Regular rate and rhythm; No murmurs, rubs, or gallops  ABDOMEN: Soft, mildly tender, distended; Bowel sounds present  EXTREMITIES:  2+ Peripheral Pulses, No clubbing, cyanosis, or edema  LYMPH: No lymphadenopathy noted  SKIN: No rashes or lesions    LABS:  CBC Full  -  ( 30 Apr 2024 05:30 )  WBC Count : 8.08 K/uL  RBC Count : 3.52 M/uL  Hemoglobin : 11.0 g/dL  Hematocrit : 34.0 %  Platelet Count - Automated : 149 K/uL  Mean Cell Volume : 96.6 fl  Mean Cell Hemoglobin : 31.3 pg  Mean Cell Hemoglobin Concentration : 32.4 gm/dL  Auto Neutrophil # : 5.95 K/uL  Auto Lymphocyte # : 1.35 K/uL  Auto Monocyte # : 0.57 K/uL  Auto Eosinophil # : 0.00 K/uL  Auto Basophil # : 0.00 K/uL  Auto Neutrophil % : 62.3 %  Auto Lymphocyte % : 16.7 %  Auto Monocyte % : 7.0 %  Auto Eosinophil % : 0.0 %  Auto Basophil % : 0.0 %    04-30    141  |  108  |  16  ----------------------------<  124<H>  4.0   |  21<L>  |  0.83    Ca    8.5      30 Apr 2024 05:30  Phos  1.1     04-30  Mg     2.0     04-30    TPro  6.3  /  Alb  3.9  /  TBili  0.4  /  DBili  x   /  AST  26  /  ALT  21  /  AlkPhos  83  04-28    CAPILLARY BLOOD GLUCOSE      POCT Blood Glucose.: 121 mg/dL (29 Apr 2024 22:01)      Urinalysis Basic - ( 30 Apr 2024 05:30 )    Color: x / Appearance: x / SG: x / pH: x  Gluc: 124 mg/dL / Ketone: x  / Bili: x / Urobili: x   Blood: x / Protein: x / Nitrite: x   Leuk Esterase: x / RBC: x / WBC x   Sq Epi: x / Non Sq Epi: x / Bacteria: x              EKG:    ECHO, US:    RADIOLOGY:   IMPRESSION:  1.  High-grade small bowel obstruction associated with small volume   hyperdense ascites and mesenteric fluid. Consider possibility of   developing ischemia.  2.  Indeterminate 3.6 cm liver lesion, advise MRI with and without   contrast for further assessment.  3.  Numerous small lucencies scattered throughout the visualized osseous   structures. Consider heterogeneous osteopenia, myeloma, or metastatic   disease. Further workup as clinically directed and may include MRI of the   spine and/or pelvis.

## 2024-04-30 NOTE — PROGRESS NOTE ADULT - SUBJECTIVE AND OBJECTIVE BOX
Pt seen and examined at bedside. Pt states mild abdominal pain. Pt ambulating, w/ NG tube in place, endorses having multiple loose BM overnight and has ramirez in place.   Pt denies fever, chills, chest pain, SOB, nausea, vomiting, lightheadedness, dizziness.      T(F): 99.1 (04-30-24 @ 04:42), Max: 100.4 (04-29-24 @ 08:50)  HR: 86 (04-30-24 @ 04:42) (86 - 112)  BP: 111/71 (04-30-24 @ 04:42) (93/52 - 134/80)  RR: 18 (04-30-24 @ 04:42) (10 - 19)  SpO2: 98% (04-30-24 @ 04:42) (97% - 100%)  Wt(kg): --  I&O's Summary    28 Apr 2024 07:01  -  29 Apr 2024 07:00  --------------------------------------------------------  IN: 1000 mL / OUT: 650 mL / NET: 350 mL    29 Apr 2024 07:01  -  30 Apr 2024 06:42  --------------------------------------------------------  IN: 2880 mL / OUT: 950 mL / NET: 1930 mL        MEDICATIONS  (STANDING):  heparin   Injectable 5000 Unit(s) SubCutaneous every 8 hours  lactated ringers. 1000 milliLiter(s) (100 mL/Hr) IV Continuous <Continuous>  pantoprazole  Injectable 40 milliGRAM(s) IV Push daily    MEDICATIONS  (PRN):  acetaminophen   IVPB .. 1000 milliGRAM(s) IV Intermittent once PRN Mild Pain (1 - 3), Moderate Pain (4 - 6)      Physical Exam:  Constitutional: NAD  Pulmonary: no inc wob  Abdomen: incision site clean, dry, intact. Soft, mildly tender, non distended, no guarding, no rebound, normoactive bowel sounds  Extremities: no lower extremity edema or calve tenderness. SCDs in place     LABS:                        12.9   3.08  )-----------( 189      ( 29 Apr 2024 05:30 )             40.0     04-29    140  |  105  |  11  ----------------------------<  113<H>  3.3<L>   |  21<L>  |  0.74    Ca    9.2      29 Apr 2024 05:30  Phos  3.6     04-29  Mg     1.9     04-29    TPro  6.3  /  Alb  3.9  /  TBili  0.4  /  DBili  x   /  AST  26  /  ALT  21  /  AlkPhos  83  04-28      Urinalysis Basic - ( 29 Apr 2024 14:55 )    Color: Dark Yellow / Appearance: Clear / SG: >1.030 / pH: x  Gluc: x / Ketone: 40 mg/dL  / Bili: Negative / Urobili: 1.0 mg/dL   Blood: x / Protein: 30 mg/dL / Nitrite: Negative   Leuk Esterase: Negative / RBC: 5 /HPF / WBC 1 /HPF   Sq Epi: x / Non Sq Epi: 2 /HPF / Bacteria: Negative /HPF        RADIOLOGY & ADDITIONAL TESTS:

## 2024-04-30 NOTE — PROGRESS NOTE ADULT - SUBJECTIVE AND OBJECTIVE BOX
SUBJECTIVE: Patient examined bedside in conjunction with GYN team. Endorses mild abd pain and multiple loose bowel BMs overnight.      MEDICATIONS  (STANDING):  heparin   Injectable 5000 Unit(s) SubCutaneous every 8 hours  lactated ringers. 1000 milliLiter(s) (100 mL/Hr) IV Continuous <Continuous>  pantoprazole  Injectable 40 milliGRAM(s) IV Push daily    MEDICATIONS  (PRN):      Vital Signs Last 24 Hrs  T(C): 37.3 (30 Apr 2024 04:42), Max: 38 (29 Apr 2024 08:50)  T(F): 99.1 (30 Apr 2024 04:42), Max: 100.4 (29 Apr 2024 08:50)  HR: 86 (30 Apr 2024 04:42) (86 - 112)  BP: 111/71 (30 Apr 2024 04:42) (93/52 - 134/80)  BP(mean): 71 (29 Apr 2024 20:05) (68 - 98)  RR: 18 (30 Apr 2024 04:42) (10 - 19)  SpO2: 98% (30 Apr 2024 04:42) (97% - 100%)    Parameters below as of 30 Apr 2024 04:42  Patient On (Oxygen Delivery Method): nasal cannula  O2 Flow (L/min): 2      PHYSICAL EXAM:  General: NAD, resting comfortably in bed  Pulmonary: Nonlabored breathing, no respiratory distress  Cardiovascular: NSR  Abdominal: soft, mild TTP, nondistended, incisions clean/dry/intact  Extremities: WWP, normal strength  Neuro: A/O x 3, CNs II-XII grossly intact, no focal deficits                I&O's Detail    29 Apr 2024 07:01  -  30 Apr 2024 07:00  --------------------------------------------------------  IN:    IV PiggyBack: 100 mL    IV PiggyBack: 100 mL    IV PiggyBack: 130 mL    Lactated Ringers: 750 mL    Lactated Ringers: 1000 mL    Lactated Ringers Bolus: 1000 mL  Total IN: 3080 mL    OUT:    Indwelling Catheter - Urethral (mL): 600 mL    Nasogastric/Oral tube (mL): 50 mL    Oral Fluid: 0 mL    Voided (mL): 300 mL  Total OUT: 950 mL    Total NET: 2130 mL          LABS:                        12.9   3.08  )-----------( 189      ( 29 Apr 2024 05:30 )             40.0     04-29    140  |  105  |  11  ----------------------------<  113<H>  3.3<L>   |  21<L>  |  0.74    Ca    9.2      29 Apr 2024 05:30  Phos  3.6     04-29  Mg     1.9     04-29    TPro  6.3  /  Alb  3.9  /  TBili  0.4  /  DBili  x   /  AST  26  /  ALT  21  /  AlkPhos  83  04-28      Urinalysis Basic - ( 29 Apr 2024 14:55 )    Color: Dark Yellow / Appearance: Clear / SG: >1.030 / pH: x  Gluc: x / Ketone: 40 mg/dL  / Bili: Negative / Urobili: 1.0 mg/dL   Blood: x / Protein: 30 mg/dL / Nitrite: Negative   Leuk Esterase: Negative / RBC: 5 /HPF / WBC 1 /HPF   Sq Epi: x / Non Sq Epi: 2 /HPF / Bacteria: Negative /HPF        RADIOLOGY & ADDITIONAL STUDIES:

## 2024-04-30 NOTE — CONSULT NOTE ADULT - ASSESSMENT
60 yo female with PMH of MM and significant surgical hx of salpingectomy, cervical vertebral fusion, 4/19/24 [facility] BS, s/p admission 4/23-26 for high grade SBO, now presenting for nausea, vomiting, and abdominal pain, found to have high grade SBO. S/p dx laparoscopy, appendectomy, EDEL by general surgery 4/29.       High-grade small bowel obstruction after TLH   post op state   CT showed High-grade small bowel obstruction associated with small volume   hyperdense ascites and mesenteric fluid  S/p dx laparoscopy, appendectomy, EDEL by general surgery 4/29.    NGT removed, ramirez in place- recommend TOV   npo   OOTB to chair   Encourage ambulation ; Encourage incentive spirometry  rest of the management per primary team    Multiple Myeloma   CT showed Numerous small lucensies scattered throughout the visualized osseous   structures   pt was on active treatment w daratumumab once a month- on hold. recommend fu after discharge       abnormal CT   Indeterminate 3.6 cm liver lesion- likely the hemangioma pt knows about it   fu with outpt PCP     dvt PPX SQH 60 yo female with PMH of MM and significant surgical hx of salpingectomy, cervical vertebral fusion, 4/19/24 [facility] BS, s/p admission 4/23-26 for high grade SBO, now presenting for nausea, vomiting, and abdominal pain, found to have high grade SBO. S/p dx laparoscopy, appendectomy, EDEL by general surgery 4/29.       High-grade small bowel obstruction after TL[facility]   post op state   CT showed High-grade small bowel obstruction associated with small volume   hyperdense ascites and mesenteric fluid  S/p dx laparoscopy, appendectomy, EDEL by general surgery 4/29.    NGT removed, ramirez in place- recommend TOV   npo   OOTB to chair   Encourage ambulation ; Encourage incentive spirometry  rest of the management per primary team    Multiple Myeloma  anemia and thrombocytopenia    CT showed Numerous small lucensies scattered throughout the visualized osseous   structures   hgb 11, plt 149- ctm qd labs   pt was on active treatment w daratumumab once a month- on hold. recommend fu after discharge     hypophosphatemia   phos 1.1  recommend replacement       abnormal CT   Indeterminate 3.6 cm liver lesion- likely the hemangioma pt knows about it   fu with outpt PCP     dvt PPX [facility]

## 2024-05-01 LAB
ANION GAP SERPL CALC-SCNC: 10 MMOL/L — SIGNIFICANT CHANGE UP (ref 5–17)
ANISOCYTOSIS BLD QL: SLIGHT — SIGNIFICANT CHANGE UP
BASOPHILS # BLD AUTO: 0 K/UL — SIGNIFICANT CHANGE UP (ref 0–0.2)
BASOPHILS NFR BLD AUTO: 0 % — SIGNIFICANT CHANGE UP (ref 0–2)
BUN SERPL-MCNC: 9 MG/DL — SIGNIFICANT CHANGE UP (ref 7–23)
CALCIUM SERPL-MCNC: 8.7 MG/DL — SIGNIFICANT CHANGE UP (ref 8.4–10.5)
CHLORIDE SERPL-SCNC: 105 MMOL/L — SIGNIFICANT CHANGE UP (ref 96–108)
CO2 SERPL-SCNC: 22 MMOL/L — SIGNIFICANT CHANGE UP (ref 22–31)
CREAT SERPL-MCNC: 0.78 MG/DL — SIGNIFICANT CHANGE UP (ref 0.5–1.3)
EGFR: 87 ML/MIN/1.73M2 — SIGNIFICANT CHANGE UP
EOSINOPHIL # BLD AUTO: 0.23 K/UL — SIGNIFICANT CHANGE UP (ref 0–0.5)
EOSINOPHIL NFR BLD AUTO: 2.6 % — SIGNIFICANT CHANGE UP (ref 0–6)
GLUCOSE SERPL-MCNC: 100 MG/DL — HIGH (ref 70–99)
HCT VFR BLD CALC: 34.1 % — LOW (ref 34.5–45)
HGB BLD-MCNC: 10.8 G/DL — LOW (ref 11.5–15.5)
LYMPHOCYTES # BLD AUTO: 0.31 K/UL — LOW (ref 1–3.3)
LYMPHOCYTES # BLD AUTO: 3.5 % — LOW (ref 13–44)
MAGNESIUM SERPL-MCNC: 2 MG/DL — SIGNIFICANT CHANGE UP (ref 1.6–2.6)
MANUAL SMEAR VERIFICATION: SIGNIFICANT CHANGE UP
MCHC RBC-ENTMCNC: 31.1 PG — SIGNIFICANT CHANGE UP (ref 27–34)
MCHC RBC-ENTMCNC: 31.7 GM/DL — LOW (ref 32–36)
MCV RBC AUTO: 98.3 FL — SIGNIFICANT CHANGE UP (ref 80–100)
MONOCYTES # BLD AUTO: 0.46 K/UL — SIGNIFICANT CHANGE UP (ref 0–0.9)
MONOCYTES NFR BLD AUTO: 5.2 % — SIGNIFICANT CHANGE UP (ref 2–14)
NEUTROPHILS # BLD AUTO: 7.87 K/UL — HIGH (ref 1.8–7.4)
NEUTROPHILS NFR BLD AUTO: 87.8 % — HIGH (ref 43–77)
NEUTS BAND # BLD: 0.9 % — SIGNIFICANT CHANGE UP (ref 0–8)
OVALOCYTES BLD QL SMEAR: SLIGHT — SIGNIFICANT CHANGE UP
PHOSPHATE SERPL-MCNC: 1.5 MG/DL — LOW (ref 2.5–4.5)
PHOSPHATE SERPL-MCNC: 2 MG/DL — LOW (ref 2.5–4.5)
PLAT MORPH BLD: ABNORMAL
PLATELET # BLD AUTO: 172 K/UL — SIGNIFICANT CHANGE UP (ref 150–400)
POIKILOCYTOSIS BLD QL AUTO: SLIGHT — SIGNIFICANT CHANGE UP
POLYCHROMASIA BLD QL SMEAR: SLIGHT — SIGNIFICANT CHANGE UP
POTASSIUM SERPL-MCNC: 3.5 MMOL/L — SIGNIFICANT CHANGE UP (ref 3.5–5.3)
POTASSIUM SERPL-SCNC: 3.5 MMOL/L — SIGNIFICANT CHANGE UP (ref 3.5–5.3)
RBC # BLD: 3.47 M/UL — LOW (ref 3.8–5.2)
RBC # FLD: 15.6 % — HIGH (ref 10.3–14.5)
RBC BLD AUTO: ABNORMAL
SODIUM SERPL-SCNC: 137 MMOL/L — SIGNIFICANT CHANGE UP (ref 135–145)
WBC # BLD: 8.87 K/UL — SIGNIFICANT CHANGE UP (ref 3.8–10.5)
WBC # FLD AUTO: 8.87 K/UL — SIGNIFICANT CHANGE UP (ref 3.8–10.5)

## 2024-05-01 PROCEDURE — 99233 SBSQ HOSP IP/OBS HIGH 50: CPT

## 2024-05-01 PROCEDURE — 99231 SBSQ HOSP IP/OBS SF/LOW 25: CPT | Mod: 24,GC

## 2024-05-01 RX ORDER — SODIUM,POTASSIUM PHOSPHATES 278-250MG
1 POWDER IN PACKET (EA) ORAL
Refills: 0 | Status: COMPLETED | OUTPATIENT
Start: 2024-05-01 | End: 2024-05-01

## 2024-05-01 RX ORDER — POTASSIUM PHOSPHATE, MONOBASIC POTASSIUM PHOSPHATE, DIBASIC 236; 224 MG/ML; MG/ML
30 INJECTION, SOLUTION INTRAVENOUS ONCE
Refills: 0 | Status: DISCONTINUED | OUTPATIENT
Start: 2024-05-01 | End: 2024-05-01

## 2024-05-01 RX ADMIN — Medication 1 PACKET(S): at 22:21

## 2024-05-01 RX ADMIN — Medication 1 PACKET(S): at 19:02

## 2024-05-01 RX ADMIN — Medication 81 MILLIGRAM(S): at 09:20

## 2024-05-01 RX ADMIN — PANTOPRAZOLE SODIUM 40 MILLIGRAM(S): 20 TABLET, DELAYED RELEASE ORAL at 19:01

## 2024-05-01 RX ADMIN — Medication 500 MILLIGRAM(S): at 09:41

## 2024-05-01 RX ADMIN — Medication 500 MILLIGRAM(S): at 19:50

## 2024-05-01 RX ADMIN — POTASSIUM PHOSPHATE, MONOBASIC POTASSIUM PHOSPHATE, DIBASIC 83.33 MILLIMOLE(S): 236; 224 INJECTION, SOLUTION INTRAVENOUS at 05:53

## 2024-05-01 RX ADMIN — Medication 50 MILLIEQUIVALENT(S): at 01:01

## 2024-05-01 RX ADMIN — Medication 500 MILLIGRAM(S): at 23:31

## 2024-05-01 RX ADMIN — Medication 500 MILLIGRAM(S): at 14:06

## 2024-05-01 RX ADMIN — HEPARIN SODIUM 5000 UNIT(S): 5000 INJECTION INTRAVENOUS; SUBCUTANEOUS at 16:37

## 2024-05-01 RX ADMIN — HEPARIN SODIUM 5000 UNIT(S): 5000 INJECTION INTRAVENOUS; SUBCUTANEOUS at 23:31

## 2024-05-01 RX ADMIN — HEPARIN SODIUM 5000 UNIT(S): 5000 INJECTION INTRAVENOUS; SUBCUTANEOUS at 07:22

## 2024-05-01 NOTE — PROGRESS NOTE ADULT - SUBJECTIVE AND OBJECTIVE BOX
Patient is a 59y old  Female who presents with a chief complaint of     INTERVAL HPI/OVERNIGHT EVENTS: offers no new complaints; current symptoms resolving    MEDICATIONS  (STANDING):  acetaminophen     Tablet .. 500 milliGRAM(s) Oral every 4 hours  aspirin  chewable 81 milliGRAM(s) Oral daily  heparin   Injectable 5000 Unit(s) SubCutaneous every 8 hours  pantoprazole  Injectable 40 milliGRAM(s) IV Push daily    MEDICATIONS  (PRN):  oxyCODONE    IR 2.5 milliGRAM(s) Oral every 4 hours PRN Moderate Pain (4 - 6)  oxyCODONE    IR 5 milliGRAM(s) Oral every 4 hours PRN Severe Pain (7 - 10)      __________________________________________________  REVIEW OF SYSTEMS:    CONSTITUTIONAL: No fever,   EYES: no acute visual disturbances  NECK: No pain or stiffness  RESPIRATORY: No cough; No shortness of breath  CARDIOVASCULAR: No chest pain, no palpitations  GASTROINTESTINAL: mild pain. No nausea or vomiting   NEUROLOGICAL: No headache or numbness, no tremors  MUSCULOSKELETAL: No joint pain, no muscle pain  GENITOURINARY: no dysuria, no frequency, no hesitancy  PSYCHIATRY: no depression , no anxiety  ALL OTHER  ROS negative        Vital Signs Last 24 Hrs  T(C): 36.8 (01 May 2024 09:20), Max: 37.1 (01 May 2024 04:57)  T(F): 98.3 (01 May 2024 09:20), Max: 98.7 (01 May 2024 04:57)  HR: 71 (01 May 2024 09:20) (71 - 89)  BP: 113/76 (01 May 2024 09:20) (100/65 - 115/76)  BP(mean): --  RR: 16 (01 May 2024 09:20) (16 - 18)  SpO2: 98% (01 May 2024 09:20) (95% - 99%)    Parameters below as of 01 May 2024 09:20  Patient On (Oxygen Delivery Method): room air        ________________________________________________  PHYSICAL EXAM:  GENERAL: NAD  HEENT: Normocephalic;  conjunctivae and sclerae clear; moist mucous membranes;   NECK : supple  CHEST/LUNG: Clear to auscultation bilaterally with good air entry   HEART: S1 S2  regular; no murmurs, gallops or rubs  ABDOMEN: Soft, ttp, mildly  distended; Bowel sounds present  EXTREMITIES: no cyanosis; no edema; no calf tenderness  SKIN: warm and dry; no rash  NERVOUS SYSTEM:  Awake and alert; Oriented  to place, person and time ; no new deficits    _________________________________________________  LABS:                        10.8   8.87  )-----------( 172      ( 01 May 2024 05:30 )             34.1     05-01    137  |  105  |  9   ----------------------------<  100<H>  3.5   |  22  |  0.78    Ca    8.7      01 May 2024 05:30  Phos  1.5     05-01  Mg     2.0     05-01        Urinalysis Basic - ( 01 May 2024 05:30 )    Color: x / Appearance: x / SG: x / pH: x  Gluc: 100 mg/dL / Ketone: x  / Bili: x / Urobili: x   Blood: x / Protein: x / Nitrite: x   Leuk Esterase: x / RBC: x / WBC x   Sq Epi: x / Non Sq Epi: x / Bacteria: x      CAPILLARY BLOOD GLUCOSE            RADIOLOGY & ADDITIONAL TESTS:      Plan of care was discussed with patient and /or primary care giver; all questions and concerns were addressed and care was aligned with patient's wishes.

## 2024-05-01 NOTE — PROGRESS NOTE ADULT - SUBJECTIVE AND OBJECTIVE BOX
Patient evaluated at bedside. No acute events overnight. Patient denies chest pain, SOB, nausea, vomiting. Pain well controlled on medications. Patient is ambulating independently, voiding spontaneously, passing bowel movements, and tolerating a clear liquid diet.      T(C): 37.1 (05-01-24 @ 04:57), Max: 37.1 (05-01-24 @ 04:57)  HR: 84 (05-01-24 @ 04:57) (77 - 91)  BP: 110/72 (05-01-24 @ 04:57) (100/65 - 115/76)  RR: 17 (05-01-24 @ 04:57) (17 - 18)  SpO2: 97% (05-01-24 @ 04:57) (95% - 99%)  Wt(kg): --    Gen: Well-appearing. NAD.  Resp: Breathing comfortably on RA.  Abd: Soft, appropriately TTP post-op, non-distended, +BS  Ext: No calf tenderness        04-29 @ 07:01  -  04-30 @ 07:00  --------------------------------------------------------  IN: 3080 mL / OUT: 950 mL / NET: 2130 mL    04-30 @ 07:01  -  05-01 @ 06:44  --------------------------------------------------------  IN: 2680 mL / OUT: 950 mL / NET: 1730 mL            acetaminophen     Tablet .. 500 milliGRAM(s) Oral every 4 hours  aspirin  chewable 81 milliGRAM(s) Oral daily  dextrose 5% + sodium chloride 0.45% 1000 milliLiter(s) IV Continuous <Continuous>  heparin   Injectable 5000 Unit(s) SubCutaneous every 8 hours  oxyCODONE    IR 2.5 milliGRAM(s) Oral every 4 hours PRN  oxyCODONE    IR 5 milliGRAM(s) Oral every 4 hours PRN  pantoprazole  Injectable 40 milliGRAM(s) IV Push daily

## 2024-05-01 NOTE — PROGRESS NOTE ADULT - SUBJECTIVE AND OBJECTIVE BOX
STATUS POST: diagnostic lap, EDEL, appendectomy    POST OPERATIVE DAY #: 2    SUBJECTIVE: Pt seen and examined by chief resident. Pt is doing well, resting comfortably on bed. Pain controlled. Tolerating sips. +F/-BM. No nausea or vomiting. No complaints at this time.    Vital Signs Last 24 Hrs  T(C): 37.1 (01 May 2024 04:57), Max: 37.1 (01 May 2024 04:57)  T(F): 98.7 (01 May 2024 04:57), Max: 98.7 (01 May 2024 04:57)  HR: 84 (01 May 2024 04:57) (77 - 91)  BP: 110/72 (01 May 2024 04:57) (100/65 - 115/76)  BP(mean): --  RR: 17 (01 May 2024 04:57) (17 - 18)  SpO2: 97% (01 May 2024 04:57) (95% - 99%)    Parameters below as of 01 May 2024 04:57  Patient On (Oxygen Delivery Method): room air        I&O's Summary    30 Apr 2024 07:01  -  01 May 2024 07:00  --------------------------------------------------------  IN: 2680 mL / OUT: 950 mL / NET: 1730 mL        Physical Exam:  General Appearance: Appears well, NAD  Pulmonary: Nonlabored breathing, no respiratory distress  Abdomen: Soft, nondisteded, appropriate incisional tenderness, incisions clean and dry and intact  Extremities: WWP, SCD's in place     LABS:                        11.0   8.08  )-----------( 149      ( 30 Apr 2024 05:30 )             34.0     04-30    140  |  103  |  14  ----------------------------<  132<H>  3.1<L>   |  24  |  0.88    Ca    8.5      30 Apr 2024 16:53  Phos  2.1     04-30  Mg     2.0     04-30        Urinalysis Basic - ( 30 Apr 2024 16:53 )    Color: x / Appearance: x / SG: x / pH: x  Gluc: 132 mg/dL / Ketone: x  / Bili: x / Urobili: x   Blood: x / Protein: x / Nitrite: x   Leuk Esterase: x / RBC: x / WBC x   Sq Epi: x / Non Sq Epi: x / Bacteria: x

## 2024-05-02 ENCOUNTER — NON-APPOINTMENT (OUTPATIENT)
Age: 59
End: 2024-05-02

## 2024-05-02 ENCOUNTER — TRANSCRIPTION ENCOUNTER (OUTPATIENT)
Age: 59
End: 2024-05-02

## 2024-05-02 VITALS
TEMPERATURE: 98 F | OXYGEN SATURATION: 97 % | HEART RATE: 73 BPM | SYSTOLIC BLOOD PRESSURE: 137 MMHG | DIASTOLIC BLOOD PRESSURE: 77 MMHG | RESPIRATION RATE: 18 BRPM
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
## 2024-05-02 DIAGNOSIS — C90.00 MULTIPLE MYELOMA NOT HAVING ACHIEVED REMISSION: ICD-10-CM
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
## 2024-05-02 DIAGNOSIS — Z98.1 ARTHRODESIS STATUS: ICD-10-CM

## 2024-05-02 DIAGNOSIS — K91.30 POSTPROCEDURAL INTESTINAL OBSTRUCTION, UNSPECIFIED AS TO PARTIAL VERSUS COMPLETE: ICD-10-CM

## 2024-05-02 DIAGNOSIS — E78.5 HYPERLIPIDEMIA, UNSPECIFIED: ICD-10-CM

## 2024-05-02 LAB
ANION GAP SERPL CALC-SCNC: 9 MMOL/L — SIGNIFICANT CHANGE UP (ref 5–17)
BUN SERPL-MCNC: 6 MG/DL — LOW (ref 7–23)
CALCIUM SERPL-MCNC: 8.9 MG/DL — SIGNIFICANT CHANGE UP (ref 8.4–10.5)
CHLORIDE SERPL-SCNC: 108 MMOL/L — SIGNIFICANT CHANGE UP (ref 96–108)
CO2 SERPL-SCNC: 23 MMOL/L — SIGNIFICANT CHANGE UP (ref 22–31)
CREAT SERPL-MCNC: 0.74 MG/DL — SIGNIFICANT CHANGE UP (ref 0.5–1.3)
EGFR: 93 ML/MIN/1.73M2 — SIGNIFICANT CHANGE UP
GLUCOSE SERPL-MCNC: 99 MG/DL — SIGNIFICANT CHANGE UP (ref 70–99)
HCT VFR BLD CALC: 30.5 % — LOW (ref 34.5–45)
HGB BLD-MCNC: 9.7 G/DL — LOW (ref 11.5–15.5)
MAGNESIUM SERPL-MCNC: 1.9 MG/DL — SIGNIFICANT CHANGE UP (ref 1.6–2.6)
MCHC RBC-ENTMCNC: 30.7 PG — SIGNIFICANT CHANGE UP (ref 27–34)
MCHC RBC-ENTMCNC: 31.8 GM/DL — LOW (ref 32–36)
MCV RBC AUTO: 96.5 FL — SIGNIFICANT CHANGE UP (ref 80–100)
NRBC # BLD: 0 /100 WBCS — SIGNIFICANT CHANGE UP (ref 0–0)
PHOSPHATE SERPL-MCNC: 3.1 MG/DL — SIGNIFICANT CHANGE UP (ref 2.5–4.5)
PLATELET # BLD AUTO: 173 K/UL — SIGNIFICANT CHANGE UP (ref 150–400)
POTASSIUM SERPL-MCNC: 4 MMOL/L — SIGNIFICANT CHANGE UP (ref 3.5–5.3)
POTASSIUM SERPL-SCNC: 4 MMOL/L — SIGNIFICANT CHANGE UP (ref 3.5–5.3)
RBC # BLD: 3.16 M/UL — LOW (ref 3.8–5.2)
RBC # FLD: 15.3 % — HIGH (ref 10.3–14.5)
SODIUM SERPL-SCNC: 140 MMOL/L — SIGNIFICANT CHANGE UP (ref 135–145)
WBC # BLD: 6.5 K/UL — SIGNIFICANT CHANGE UP (ref 3.8–10.5)
WBC # FLD AUTO: 6.5 K/UL — SIGNIFICANT CHANGE UP (ref 3.8–10.5)

## 2024-05-02 PROCEDURE — 88304 TISSUE EXAM BY PATHOLOGIST: CPT

## 2024-05-02 PROCEDURE — 87040 BLOOD CULTURE FOR BACTERIA: CPT

## 2024-05-02 PROCEDURE — 96375 TX/PRO/DX INJ NEW DRUG ADDON: CPT

## 2024-05-02 PROCEDURE — 83690 ASSAY OF LIPASE: CPT

## 2024-05-02 PROCEDURE — 74177 CT ABD & PELVIS W/CONTRAST: CPT | Mod: MC

## 2024-05-02 PROCEDURE — 81001 URINALYSIS AUTO W/SCOPE: CPT

## 2024-05-02 PROCEDURE — 99231 SBSQ HOSP IP/OBS SF/LOW 25: CPT | Mod: 24,GC

## 2024-05-02 PROCEDURE — 86922 COMPATIBILITY TEST ANTIGLOB: CPT

## 2024-05-02 PROCEDURE — 82962 GLUCOSE BLOOD TEST: CPT

## 2024-05-02 PROCEDURE — 83735 ASSAY OF MAGNESIUM: CPT

## 2024-05-02 PROCEDURE — 84100 ASSAY OF PHOSPHORUS: CPT

## 2024-05-02 PROCEDURE — 0225U NFCT DS DNA&RNA 21 SARSCOV2: CPT

## 2024-05-02 PROCEDURE — 83605 ASSAY OF LACTIC ACID: CPT

## 2024-05-02 PROCEDURE — 85027 COMPLETE CBC AUTOMATED: CPT

## 2024-05-02 PROCEDURE — C1889: CPT

## 2024-05-02 PROCEDURE — 85025 COMPLETE CBC W/AUTO DIFF WBC: CPT

## 2024-05-02 PROCEDURE — 80053 COMPREHEN METABOLIC PANEL: CPT

## 2024-05-02 PROCEDURE — 86900 BLOOD TYPING SEROLOGIC ABO: CPT

## 2024-05-02 PROCEDURE — 71045 X-RAY EXAM CHEST 1 VIEW: CPT

## 2024-05-02 PROCEDURE — 80048 BASIC METABOLIC PNL TOTAL CA: CPT

## 2024-05-02 PROCEDURE — 99285 EMERGENCY DEPT VISIT HI MDM: CPT

## 2024-05-02 PROCEDURE — 96374 THER/PROPH/DIAG INJ IV PUSH: CPT

## 2024-05-02 PROCEDURE — 86901 BLOOD TYPING SEROLOGIC RH(D): CPT

## 2024-05-02 PROCEDURE — 82803 BLOOD GASES ANY COMBINATION: CPT

## 2024-05-02 PROCEDURE — 86970 RBC PRETX INCUBATJ W/CHEMICL: CPT

## 2024-05-02 PROCEDURE — 86850 RBC ANTIBODY SCREEN: CPT

## 2024-05-02 PROCEDURE — 87086 URINE CULTURE/COLONY COUNT: CPT

## 2024-05-02 PROCEDURE — 36415 COLL VENOUS BLD VENIPUNCTURE: CPT

## 2024-05-02 PROCEDURE — 99233 SBSQ HOSP IP/OBS HIGH 50: CPT

## 2024-05-02 PROCEDURE — 86880 COOMBS TEST DIRECT: CPT

## 2024-05-02 RX ORDER — PANTOPRAZOLE SODIUM 20 MG/1
40 TABLET, DELAYED RELEASE ORAL
Refills: 0 | Status: DISCONTINUED | OUTPATIENT
Start: 2024-05-02 | End: 2024-05-02

## 2024-05-02 RX ORDER — DARATUMUMAB 100 MG/5ML
0 INJECTION, SOLUTION, CONCENTRATE INTRAVENOUS
Refills: 0 | DISCHARGE

## 2024-05-02 RX ORDER — OXYCODONE HYDROCHLORIDE 5 MG/1
1 TABLET ORAL
Qty: 9 | Refills: 0
Start: 2024-05-02 | End: 2024-05-04

## 2024-05-02 RX ORDER — LENALIDOMIDE 5 MG/1
1 CAPSULE ORAL
Refills: 0 | DISCHARGE

## 2024-05-02 RX ORDER — OXYCODONE HYDROCHLORIDE 5 MG/1
1 TABLET ORAL
Qty: 16 | Refills: 0
Start: 2024-05-02 | End: 2024-05-05

## 2024-05-02 RX ORDER — DOCUSATE SODIUM 100 MG
1 CAPSULE ORAL
Qty: 60 | Refills: 0
Start: 2024-05-02 | End: 2024-05-31

## 2024-05-02 RX ORDER — MAGNESIUM SULFATE 500 MG/ML
1 VIAL (ML) INJECTION ONCE
Refills: 0 | Status: COMPLETED | OUTPATIENT
Start: 2024-05-02 | End: 2024-05-02

## 2024-05-02 RX ORDER — DOCUSATE SODIUM 100 MG
1 CAPSULE ORAL
Qty: 8 | Refills: 0
Start: 2024-05-02 | End: 2024-05-05

## 2024-05-02 RX ADMIN — HEPARIN SODIUM 5000 UNIT(S): 5000 INJECTION INTRAVENOUS; SUBCUTANEOUS at 15:36

## 2024-05-02 RX ADMIN — Medication 500 MILLIGRAM(S): at 17:02

## 2024-05-02 RX ADMIN — HEPARIN SODIUM 5000 UNIT(S): 5000 INJECTION INTRAVENOUS; SUBCUTANEOUS at 07:08

## 2024-05-02 RX ADMIN — Medication 81 MILLIGRAM(S): at 10:16

## 2024-05-02 NOTE — DISCHARGE NOTE PROVIDER - NSDCCPTREATMENT_GEN_ALL_CORE_FT
PRINCIPAL PROCEDURE  Procedure: Diagnostic laparoscopy  Findings and Treatment:       SECONDARY PROCEDURE  Procedure: Lap appendectomy  Findings and Treatment:     Procedure: Lysis, adhesions, intestine  Findings and Treatment:

## 2024-05-02 NOTE — DISCHARGE NOTE NURSING/CASE MANAGEMENT/SOCIAL WORK - NSDCPEFALRISK_GEN_ALL_CORE
For information on Fall & Injury Prevention, visit: [email or web address] OR  [email or web address] OR  https://www.cdc.gov/steadi/patient.html

## 2024-05-02 NOTE — PROGRESS NOTE ADULT - SUBJECTIVE AND OBJECTIVE BOX
Patient evaluated at bedside. No acute events overnight. Patient denies chest pain, SOB, nausea, vomiting. Pain well controlled on medications. Patient is ambulating independently, voiding spontaneously, passing flatus, and tolerating a low fiber diet.      T(C): 36.8 (05-02-24 @ 05:09), Max: 37.1 (05-01-24 @ 16:56)  HR: 73 (05-02-24 @ 05:09) (71 - 82)  BP: 115/69 (05-02-24 @ 05:09) (113/72 - 134/77)  RR: 17 (05-02-24 @ 05:09) (16 - 18)  SpO2: 97% (05-02-24 @ 05:09) (97% - 99%)  Wt(kg): --    Gen: Well-appearing. NAD.  Resp: Breathing comfortably on RA.  Abd: Soft, non-tender, minimally distended, +BS  Ext: No calf tenderness        04-30 @ 07:01  -  05-01 @ 07:00  --------------------------------------------------------  IN: 3040 mL / OUT: 950 mL / NET: 2090 mL    05-01 @ 07:01  -  05-02 @ 06:44  --------------------------------------------------------  IN: 240 mL / OUT: 1000 mL / NET: -760 mL            acetaminophen     Tablet .. 500 milliGRAM(s) Oral every 4 hours  aspirin  chewable 81 milliGRAM(s) Oral daily  heparin   Injectable 5000 Unit(s) SubCutaneous every 8 hours  oxyCODONE    IR 2.5 milliGRAM(s) Oral every 4 hours PRN  oxyCODONE    IR 5 milliGRAM(s) Oral every 4 hours PRN  pantoprazole  Injectable 40 milliGRAM(s) IV Push daily

## 2024-05-02 NOTE — DISCHARGE NOTE NURSING/CASE MANAGEMENT/SOCIAL WORK - PATIENT PORTAL LINK FT
You can access the FollowMyHealth Patient Portal offered by [facility] by registering at the following website: [email or web address]. By joining [facility]’s FollowMyHealth portal, you will also be able to view your health information using other applications (apps) compatible with our system.

## 2024-05-02 NOTE — DISCHARGE NOTE PROVIDER - HOSPITAL COURSE
58 yo P0 s/p [facility] BS, s/p admission 4/23-26 for high grade SBO, now presenting for nausea, vomiting (>10 episodes), abdominal pain, and low PO tolerance. Patient was admitted for surgical management and is now s/p diagnostic laparoscopy with lysis of adhesions and appendectomy (4/29). Patient's procedure was uncomplicated and tolerated.       5/2:   ON: -n/-v, jeanie min amount of LRD   5/1: adv to low res, jeanie, no N/V, phos 1.5, repeat 2  ON: -n/-v jeanie sips, +f/-bm, pain well controlled, pTOV  4/30: dc NGT, sips, +f/+bm, Cm removed. 4 pm bmp repleted.  ON: s/p diagnostic lap, EDEL, appendectomy, POC wnl, CXR confirm NGT placement, BM 2x, 500cc bolus low UOP  4/29: K and mg repleted, temp 100.4 @9am, tachycardic      58 yo P0 s/p [facility] BS, s/p admission 4/23-26 for high grade SBO, now presenting for nausea, vomiting (>10 episodes), abdominal pain, and low PO tolerance. Patient was admitted for surgical management with an nasogastric tube. Patient is now s/p diagnostic laparoscopy with lysis of adhesions and appendectomy (4/29). Patient's procedure was uncomplicated and tolerated well. Her post-operative course was uneventful. Post-op, pain and nausea were controlled. NG tube and ramirez were removed on post-operative day 1 with no complications. Patient passed trial of void. Patient's diet was advanced appropriately and toelrated without nausea/vomiting. Patient is ambulating without difficulty, voiding spontaneously, and has return of bowel function. Pain is well controlled on oral pain medication. This patient is deemed ready for discharge and will follow up with Dr. Santiago in the outpatient setting.

## 2024-05-02 NOTE — PROGRESS NOTE ADULT - SUBJECTIVE AND OBJECTIVE BOX
STATUS POST:  diagnostic lap, EDEL, appendectomy (4/29)     ON: -n/-v, jeanie min amount of LRD       SUBJECTIVE: Patient seen and examined bedside by chief resident. Patient tolerating diet, -n/-v. Denies sob/dizziness/cp    aspirin  chewable 81 milliGRAM(s) Oral daily  heparin   Injectable 5000 Unit(s) SubCutaneous every 8 hours      Vital Signs Last 24 Hrs  T(C): 36.8 (02 May 2024 05:09), Max: 37.1 (01 May 2024 16:56)  T(F): 98.3 (02 May 2024 05:09), Max: 98.7 (01 May 2024 16:56)  HR: 73 (02 May 2024 05:09) (71 - 82)  BP: 115/69 (02 May 2024 05:09) (113/72 - 134/77)  BP(mean): --  RR: 17 (02 May 2024 05:09) (16 - 18)  SpO2: 97% (02 May 2024 05:09) (97% - 99%)    Parameters below as of 02 May 2024 05:09  Patient On (Oxygen Delivery Method): room air      I&O's Detail    01 May 2024 07:01  -  02 May 2024 07:00  --------------------------------------------------------  IN:    Oral Fluid: 240 mL  Total IN: 240 mL    OUT:    Voided (mL): 1000 mL  Total OUT: 1000 mL    Total NET: -760 mL          General: NAD, resting comfortably in bed  C/V: NSR  Pulm: Nonlabored breathing, no respiratory distress  Abd: soft, NT/ND. No rebound or guarding  Incisions; c/d/i  Extrem: WWP, no edema, SCDs in place        LABS:                        10.8   8.87  )-----------( 172      ( 01 May 2024 05:30 )             34.1     05-01    137  |  105  |  9   ----------------------------<  100<H>  3.5   |  22  |  0.78    Ca    8.7      01 May 2024 05:30  Phos  2.0     05-01  Mg     2.0     05-01        Urinalysis Basic - ( 01 May 2024 05:30 )    Color: x / Appearance: x / SG: x / pH: x  Gluc: 100 mg/dL / Ketone: x  / Bili: x / Urobili: x   Blood: x / Protein: x / Nitrite: x   Leuk Esterase: x / RBC: x / WBC x   Sq Epi: x / Non Sq Epi: x / Bacteria: x        RADIOLOGY & ADDITIONAL STUDIES:

## 2024-05-02 NOTE — PROGRESS NOTE ADULT - SUBJECTIVE AND OBJECTIVE BOX
Patient is a 59y old  Female who presents with a chief complaint of Bowel obstruction (02 May 2024 08:42)      INTERVAL HPI/OVERNIGHT EVENTS: offers no new complaints; current symptoms resolving    MEDICATIONS  (STANDING):  acetaminophen     Tablet .. 500 milliGRAM(s) Oral every 4 hours  aspirin  chewable 81 milliGRAM(s) Oral daily  heparin   Injectable 5000 Unit(s) SubCutaneous every 8 hours  pantoprazole    Tablet 40 milliGRAM(s) Oral before breakfast    MEDICATIONS  (PRN):  oxyCODONE    IR 2.5 milliGRAM(s) Oral every 4 hours PRN Moderate Pain (4 - 6)  oxyCODONE    IR 5 milliGRAM(s) Oral every 4 hours PRN Severe Pain (7 - 10)      __________________________________________________  REVIEW OF SYSTEMS:    CONSTITUTIONAL: No fever,   EYES: no acute visual disturbances  NECK: No pain or stiffness  RESPIRATORY: No cough; No shortness of breath  CARDIOVASCULAR: No chest pain, no palpitations  GASTROINTESTINAL: mild pain. No nausea or vomiting; No diarrhea   NEUROLOGICAL: No headache or numbness, no tremors  MUSCULOSKELETAL: No joint pain, no muscle pain  GENITOURINARY: no dysuria, no frequency, no hesitancy  PSYCHIATRY: no depression , no anxiety  ALL OTHER  ROS negative        Vital Signs Last 24 Hrs  T(C): 37.1 (02 May 2024 09:17), Max: 37.1 (01 May 2024 16:56)  T(F): 98.7 (02 May 2024 09:17), Max: 98.7 (01 May 2024 16:56)  HR: 71 (02 May 2024 09:17) (71 - 82)  BP: 145/86 (02 May 2024 09:17) (113/72 - 145/86)  BP(mean): --  RR: 17 (02 May 2024 09:17) (17 - 18)  SpO2: 100% (02 May 2024 09:17) (97% - 100%)    Parameters below as of 02 May 2024 09:17  Patient On (Oxygen Delivery Method): room air        ________________________________________________  PHYSICAL EXAM:  GENERAL: NAD  HEENT: Normocephalic;  conjunctivae and sclerae clear; moist mucous membranes;   NECK : supple  CHEST/LUNG: Clear to auscultation bilaterally with good air entry   HEART: S1 S2  regular; no murmurs, gallops or rubs  ABDOMEN: Soft, ttp, Nondistended; Bowel sounds present  EXTREMITIES: no cyanosis; no edema; no calf tenderness  SKIN: warm and dry; no rash  NERVOUS SYSTEM:  Awake and alert; Oriented  to place, person and time ; no new deficits    _________________________________________________  LABS:                        9.7    6.50  )-----------( 173      ( 02 May 2024 05:30 )             30.5     05-02    140  |  108  |  6<L>  ----------------------------<  99  4.0   |  23  |  0.74    Ca    8.9      02 May 2024 05:30  Phos  3.1     05-02  Mg     1.9     05-02        Urinalysis Basic - ( 02 May 2024 05:30 )    Color: x / Appearance: x / SG: x / pH: x  Gluc: 99 mg/dL / Ketone: x  / Bili: x / Urobili: x   Blood: x / Protein: x / Nitrite: x   Leuk Esterase: x / RBC: x / WBC x   Sq Epi: x / Non Sq Epi: x / Bacteria: x      CAPILLARY BLOOD GLUCOSE            RADIOLOGY & ADDITIONAL TESTS:      Plan of care was discussed with patient and /or primary care giver; all questions and concerns were addressed and care was aligned with patient's wishes.

## 2024-05-02 NOTE — DISCHARGE NOTE PROVIDER - NSDCCPCAREPLAN_GEN_ALL_CORE_FT
PRINCIPAL DISCHARGE DIAGNOSIS  Diagnosis: Small bowel obstruction  Assessment and Plan of Treatment: 58 yo P0 s/p [facility], s/p admission 4/23-26 for high grade SBO, now presenting for nausea, vomiting (>10 episodes), and abdominal pain. Now s/p diagnostic lap, EDEL, appendectomy (4/29)  P: Continue to get well per care instructions and care team

## 2024-05-02 NOTE — PROGRESS NOTE ADULT - ASSESSMENT
58 yo female with PMH of MM and significant surgical hx of salpingectomy, cervical vertebral fusion, 4/19/24 TLH BS, s/p admission 4/23-26 for high grade SBO, now presenting for nausea, vomiting, and abdominal pain, found to have high grade SBO. S/p dx laparoscopy, appendectomy, EDEL by general surgery 4/29.       High-grade small bowel obstruction after TLH   post op state   CT showed High-grade small bowel obstruction associated with small volume   hyperdense ascites and mesenteric fluid  S/p dx laparoscopy, appendectomy, EDEL by general surgery 4/29.    NGT removed,    diet  advanced by primary   OOTB to chair   Encourage ambulation ; Encourage incentive spirometry  rest of the management per primary team    Multiple Myeloma  anemia and thrombocytopenia    CT showed Numerous small lucensies scattered throughout the visualized osseous   structures   hgb 11-10.8 -9.7, plt 149- 172- 173 ctm qd labs   pt was on active treatment w daratumumab once a month- on hold. pt has a fu after discharge     hypophosphatemia   phos 1.1-- 1.5- 3.1 after  replacement       abnormal CT   Indeterminate 3.6 cm liver lesion- likely the hemangioma pt knows about it   fu with outpt PCP     dvt PPX SQH
60 yo P0  s/p [facility] BS, s/p admission 4/23-26 for high grade SBO, now presenting for nausea, vomiting, and abdominal pain, found to have high grade SBO. S/p dx laparoscopy, appendectomy, EDEL by general surgery 4/29.     Neuro: Tylenol PRN   Pulm: 3LNC   CV: VSS   GI: SBO NGT (4/28-). NPO. Protonix.    GYN: s/p [facility] BS   : Cm  ID: Tmax/last: 100.4 4/29 at 0900.    VTE: SCDs, SQH     [ ] CBC, BMP, Mg, Phos 4/30   [ ] F/u BCx 4/29 (received)  
58 yo P0 HD3 s/p [facility] BS, s/p admission 4/23-26 for high grade SBO, now presenting for nausea, vomiting, and abdominal pain, found to have high grade SBO. S/p dx laparoscopy, appendectomy, EDEL by general surgery 4/29.      Neuro: Tylenol PRN   Pulm: RA  CV: VSS   GI: SBO NGT (4/28-). NPO. Protonix.    GYN: s/p [facility] BS   : Cm   ID: Tmax/last: 100.4 4/29 at 0900.    VTE: SCDs, SQH     Recommendations  -Remove Cm Catheter, f/u TOV  -Maintian active T&S  - Replete electrolytes  - care per primary team  -GYN to continue to follow  d/w Dr. Marin  DC PGY3 
58 yo P0 POD#12 s/p TLH/RS, EDEL, cysto, s/p re-admission 4/23-26 for high grade SBO managed conservatively, HD#4 s/p second re-admission for nausea, vomiting, and abdominal pain, found to have high grade SBO, now POD#2 s/p dx laparoscopy, appendectomy, EDEL by general surgery 4/29.      - Diet per primary team  - Care per primary team  - Gyn to continue to follow    Talita Mayo MD PGY4  ROMULO Marin MD
58 yo P0 s/p [facility] BS, s/p admission 4/23-26 for high grade SBO, now presenting for nausea, vomiting (>10 episodes), and abdominal pain. Now s/p diagnostic lap, EDEL, appendectomy (4/29)    NPO/IVF  NGT out today  Pain control  Cm  OOBAT/IS  PPI  AM labs
58 yo female with PMH of MM and significant surgical hx of salpingectomy, cervical vertebral fusion, 4/19/24 [facility] BS, s/p admission 4/23-26 for high grade SBO, now presenting for nausea, vomiting, and abdominal pain, found to have high grade SBO. S/p dx laparoscopy, appendectomy, EDEL by general surgery 4/29.       High-grade small bowel obstruction after TLH   post op state   CT showed High-grade small bowel obstruction associated with small volume   hyperdense ascites and mesenteric fluid  S/p dx laparoscopy, appendectomy, EDEL by general surgery 4/29.    NGT removed,    diet being advanced by primary   OOTB to chair   Encourage ambulation ; Encourage incentive spirometry  rest of the management per primary team    Multiple Myeloma  anemia and thrombocytopenia    CT showed Numerous small lucensies scattered throughout the visualized osseous   structures   hgb 11-10.8, plt 149- 172- ctm qd labs   pt was on active treatment w daratumumab once a month- on hold. pt has a fu after discharge     hypophosphatemia   phos 1.1-- 1.5 recommend replacement       abnormal CT   Indeterminate 3.6 cm liver lesion- likely the hemangioma pt knows about it   fu with outpt PCP     dvt PPX SQH
60 yo P0 s/p [facility] BS, s/p admission 4/23-26 for high grade SBO, now presenting for nausea, vomiting (>10 episodes), and abdominal pain. Now s/p diagnostic lap, EDEL, appendectomy (4/29)    Low res  Pain control  OOBAT/IS  PPI  AM labs  d/c home
59 year old female with a PMHx of myeloma (on PO and IV chemotherapy) and PSHx of anterior cervical spinal fusion now s/p laparoscopic hysterectomy with extensive EDEL on 4/19/24 recently seen by general surgery for high grade SBO 4/23 managed with NGT decompression discharged on 4/26 now returns to ED with n/v. Afebrile, nontachycardic, slightly hypertensive to 140s. On exam, abdomen is soft, nondistended, mild mid-abdominal ttp without rebound or guarding. Labs show WBC 9.63, H/H 14/42.8, chemsitry unremarkable. CT AP demonstrates high-grade small bowel obstruction with transition point in the central pelvis and a second possible transition point in the right hemiabdomen.    Recommend bowel rest, IVF, repletion of NGT output PRN  NGT to LIWS  Maintain K>4, Mg>2, Phos>3  Please ensure patient is ambulating regular, discussed with patient who expressed understanding  General surgery team 4 will continue to follow, please reach out with any questions  
60 yo P0 HD2/POD10 s/p [facility] BS, s/p admission 4/23-26 for high grade SBO, now presenting for nausea, vomiting, and abdominal pain found to have reoccurrence of high grade SBO  - NPO w/ IV fluids, antiemetics ordered  - F/u general surgery recs for management of SBO    Talita Mayo MD PGY4  DW Tania MENG
60 yo P0 POD#13 s/p TLH/RS, EDEL, cysto, s/p re-admission 4/23-26 for high grade SBO managed conservatively, HD#5 s/p second re-admission for nausea, vomiting, and abdominal pain, found to have high grade SBO, now POD#3 s/p dx laparoscopy, appendectomy, EDEL by general surgery 4/29.      - Diet per primary team  - Care per primary team  - Gyn to continue to follow    Talita Mayo MD PGY4  ROMULO Marin MD
60 yo P0 s/p [facility] BS, s/p admission 4/23-26 for high grade SBO, now presenting for nausea, vomiting (>10 episodes), and abdominal pain. Now s/p diagnostic lap, EDEL, appendectomy (4/29)    NPO w sips/IVF  Pain control  OOBAT/IS  PPI  AM labs

## 2024-05-02 NOTE — DISCHARGE NOTE PROVIDER - CARE PROVIDER_API CALL
Louis Santiago  Colon/Rectal Surgery  [facility] [facility]  [place] [address]  Phone: [phone]  Fax: [phone]  Follow Up Time: 1 week

## 2024-05-02 NOTE — DISCHARGE NOTE PROVIDER - NSDCFUSCHEDAPPT_GEN_ALL_CORE_FT
Pedro Marin [facility]  OBGYANGEL [address]  Scheduled Appointment: 05/09/2024    Pedro Marin [facility]  BERTO [address]  Scheduled Appointment: 06/04/2024

## 2024-05-02 NOTE — DISCHARGE NOTE PROVIDER - NSDCMRMEDTOKEN_GEN_ALL_CORE_FT
Ambien 10 mg oral tablet: 1 tab(s) orally once a day (at bedtime)  aspirin 81 mg oral tablet: orally once a day  daratumumab 20 mg/mL intravenous solution: intravenous once a month  Multiple Vitamins oral tablet: 1 tab(s) orally once a day  Revlimid 10 mg oral capsule: 1 cap(s) orally once a day 21 days on and 7 days off  Tylenol 325 mg oral tablet: 2 tab(s) orally once a month 650mg once a month  Vitamin D3 50 mcg (2000 intl units) oral tablet: 1 tab(s) orally once a day   Ambien 10 mg oral tablet: 1 tab(s) orally once a day (at bedtime)  aspirin 81 mg oral tablet: orally once a day  Multiple Vitamins oral tablet: 1 tab(s) orally once a day  Tylenol 325 mg oral tablet: 2 tab(s) orally once a month 650mg once a month  Vitamin D3 50 mcg (2000 intl units) oral tablet: 1 tab(s) orally once a day   Ambien 10 mg oral tablet: 1 tab(s) orally once a day (at bedtime)  aspirin 81 mg oral tablet: orally once a day  Colace 100 mg oral capsule: 1 cap(s) orally 2 times a day -for constipation MDD: 2  Multiple Vitamins oral tablet: 1 tab(s) orally once a day  Tylenol 325 mg oral tablet: 2 tab(s) orally once a month 650mg once a month  Vitamin D3 50 mcg (2000 intl units) oral tablet: 1 tab(s) orally once a day

## 2024-05-02 NOTE — PROGRESS NOTE ADULT - ATTENDING COMMENTS
Gyn attg:    Patient doing well POD#10/HD#3/Re-operation POD#1 NGT and Cm removed. NPO, multiple bowel movement. Mild-moderate pain. General surgery team care appreciated. Will continue to follow    Pedro Marin MD
Gyn attg:    delayed entry, patient seen at 1pm was sleeping returned to see her at 440pm on 5/1. Patient feels well has mild discomfort with bowel movement and flatus, voiding, ambulating, afebrile. Tolerating low reside diet. Plan of care reviewed. Care of primary team appreciated.     Pedro Marin MD
gyn attg:    patient doing well, likely discharge pre primary team. Care appreciated.     Pedro Marin MD
Gyn attg:    Patient s/p laparoscopic enterolysis and appendectomy, Will continue to follow. Continue routine postoperative care    Pedro Marin MD

## 2024-05-02 NOTE — DISCHARGE NOTE PROVIDER - NSDCFUADDINST_GEN_ALL_CORE_FT
General Discharge Instructions:  Please resume all regular home medications unless specifically advised not to take a particular medication. Also, please take any new medications as prescribed.  Please get plenty of rest, continue to ambulate several times per day, and drink adequate amounts of fluids. Avoid lifting weights greater than 5-10 lbs until you follow-up with your surgeon, who will instruct you further regarding activity restrictions.  Avoid driving or operating heavy machinery while taking pain medications.  Please follow-up with your surgeon and Primary Care Provider (PCP) as advised.  Incision Care:  *Please call your doctor or nurse practitioner if you have increased pain, swelling, redness, or drainage from the incision site.  *Avoid swimming and baths until your follow-up appointment.  *You may shower, and wash surgical incisions with a mild soap and warm water. Gently pat the area dry.  *If you have staples, they will be removed at your follow-up appointment.  *If you have steri-strips, they will fall off on their own. Please remove any remaining strips 7-10 days after surgery.  Warning Signs:  Please call your doctor or nurse practitioner if you experience the following:  *You experience new chest pain, pressure, squeezing or tightness.  *New or worsening cough, shortness of breath, or wheeze.  *If you are vomiting and cannot keep down fluids or your medications.  *You are getting dehydrated due to continued vomiting, diarrhea, or other reasons. Signs of dehydration include dry mouth, rapid heartbeat, or feeling dizzy or faint when standing.  *You see blood or dark/black material when you vomit or have a bowel movement.  *You experience burning when you urinate, have blood in your urine, or experience a discharge.  *Your pain is not improving within 8-12 hours or is not gone within 24 hours. Call or return immediately if your pain is getting worse, changes location, or moves to your chest or back.  *You have shaking chills, or fever greater than 101.5 degrees Fahrenheit or 38 degrees Celsius.  *Any change in your symptoms, or any new symptoms that concern you.  Please resume Low Fiber Diet    Please follow up with Dr. Santiago in one week; you may call the office to make an appointment at your earliest convenience.   Take 2 tabs tylenol every 6 hours as needed for pain management, do not exceed 4,000mg of tylenol in 24 hours. You have also been prescribed oxycodone for pain not controlled by Tylenol. Take 1 tabs as prescribed for severe pain. Take prescribed stool softener (colace) to prevent constipation used by oxycodone.      Common side effects of New medications:   - Common side effects of tylenol: nausea, stomach upset, loss of appetite, constipation    - Common side effects of oxycodone include: nausea, constipation, stomach discomfort, drowsiness   - Common side effects of colace: nausea, diarrhea, stomach upset/cramping         Please continue home medications. Follow up with oncologist regarding resumption of your medications for chemotherapy/anti-cancer regimen.     General Discharge Instructions:   Please resume all regular home medications unless specifically advised not to take a particular medication. Also, please take any new medications as prescribed.   Please get plenty of rest, continue to ambulate several times per day, and drink adequate amounts of fluids. Avoid lifting weights greater than 5-10 lbs until you follow-up with your surgeon, who will instruct you further regarding activity restrictions.   Avoid driving or operating heavy machinery while taking pain medications.   Please follow-up with your surgeon and Primary Care Provider (PCP) as advised.     Incision Care:   *Please call your doctor if you have increased pain, swelling, redness, or drainage from the incision site.   *Avoid swimming and baths until your follow-up appointment.   *You may shower, and wash surgical incisions with a mild soap and warm water. Gently pat the area dry.     Warning Signs:   Please call your doctor if you experience the following:   *You experience new chest pain, pressure, squeezing or tightness.   *New or worsening cough, shortness of breath, or wheeze.   *If you are vomiting and cannot keep down fluids or your medications.   *You are getting dehydrated due to continued vomiting, diarrhea, or other reasons. Signs of dehydration include dry mouth, rapid heartbeat, or feeling dizzy or faint when standing.   *You see blood or dark/black material when you vomit or have a bowel movement.   *You experience burning when you urinate, have blood in your urine, or experience a discharge.   *Your pain is not improving within 8-12 hours or is not gone within 24 hours. Call or return immediately if your pain is getting worse, changes location, or moves to your chest or back.   *You have shaking chills, or fever greater than 101.5 degrees Fahrenheit or 38 degrees Celsius.   *Any change in your symptoms, or any new symptoms that concern you.

## 2024-05-04 LAB
CULTURE RESULTS: SIGNIFICANT CHANGE UP
SPECIMEN SOURCE: SIGNIFICANT CHANGE UP

## 2024-05-08 ENCOUNTER — NON-APPOINTMENT (OUTPATIENT)
Age: 59
End: 2024-05-08

## 2024-05-08 DIAGNOSIS — G47.00 INSOMNIA, UNSPECIFIED: ICD-10-CM

## 2024-05-08 DIAGNOSIS — K76.9 LIVER DISEASE, UNSPECIFIED: ICD-10-CM

## 2024-05-08 DIAGNOSIS — E78.5 HYPERLIPIDEMIA, UNSPECIFIED: ICD-10-CM

## 2024-05-08 DIAGNOSIS — E83.39 OTHER DISORDERS OF PHOSPHORUS METABOLISM: ICD-10-CM

## 2024-05-08 DIAGNOSIS — C90.00 MULTIPLE MYELOMA NOT HAVING ACHIEVED REMISSION: ICD-10-CM

## 2024-05-08 DIAGNOSIS — R18.8 OTHER ASCITES: ICD-10-CM

## 2024-05-08 DIAGNOSIS — R11.10 VOMITING, UNSPECIFIED: ICD-10-CM

## 2024-05-08 DIAGNOSIS — D64.9 ANEMIA, UNSPECIFIED: ICD-10-CM

## 2024-05-08 DIAGNOSIS — K56.609 UNSPECIFIED INTESTINAL OBSTRUCTION, UNSPECIFIED AS TO PARTIAL VERSUS COMPLETE OBSTRUCTION: ICD-10-CM

## 2024-05-08 DIAGNOSIS — K56.50 INTESTINAL ADHESIONS [BANDS], UNSPECIFIED AS TO PARTIAL VERSUS COMPLETE OBSTRUCTION: ICD-10-CM

## 2024-05-08 DIAGNOSIS — D18.09 HEMANGIOMA OF OTHER SITES: ICD-10-CM

## 2024-05-08 LAB — SURGICAL PATHOLOGY STUDY: SIGNIFICANT CHANGE UP

## 2024-05-09 ENCOUNTER — APPOINTMENT (OUTPATIENT)
Dept: OBGYN | Facility: CLINIC | Age: 59
End: 2024-05-09
Payer: COMMERCIAL

## 2024-05-09 VITALS
HEART RATE: 79 BPM | HEIGHT: 69 IN | SYSTOLIC BLOOD PRESSURE: 116 MMHG | OXYGEN SATURATION: 100 % | BODY MASS INDEX: 30.66 KG/M2 | DIASTOLIC BLOOD PRESSURE: 72 MMHG | WEIGHT: 207 LBS

## 2024-05-09 DIAGNOSIS — N85.2 HYPERTROPHY OF UTERUS: ICD-10-CM

## 2024-05-09 DIAGNOSIS — Z86.018 PERSONAL HISTORY OF OTHER BENIGN NEOPLASM: ICD-10-CM

## 2024-05-09 DIAGNOSIS — R10.2 PELVIC AND PERINEAL PAIN: ICD-10-CM

## 2024-05-09 PROCEDURE — 99024 POSTOP FOLLOW-UP VISIT: CPT

## 2024-05-09 NOTE — HISTORY OF PRESENT ILLNESS
[FreeTextEntry1] : 59 year old presents for postoperative visit s/p TOTAL LAPAROSCOPIC HYSTERECTOMY, BILATERAL SALPINGECTOMY, ENTEROLYSIS AND CYSTOSCOPY on 4/19/24 complicated by bowel obstruction and re-operation on 4/28/24 with extensive enterolysis and appendectomy.  She reports regular voiding and bowel movements, denies fevers/chills/dysuria. Currently taking Tylenol. Appetite is getting back to normal day by day. Patient is feeling good but having difficulty sleeping because she is experiencing family issues.  She saw oncologist yesterday and will start medication next week.

## 2024-05-09 NOTE — DISCUSSION/SUMMARY
[FreeTextEntry1] : 59 year old s/p TOTAL LAPAROSCOPIC HYSTERECTOMY, BILATERAL SALPINGECTOMY, ENTEROLYSIS AND CYSTOSCOPY on 4/19/24 complicated by bowel obstruction and re-operation on 4/28/24 with extensive enterolysis and appendectomy presents for postoperative visit doing well  -given copy of pathology and operative report  -continue pelvic rest with modified activity  -follow up in 3 weeks

## 2024-05-13 ENCOUNTER — NON-APPOINTMENT (OUTPATIENT)
Age: 59
End: 2024-05-13

## 2024-05-16 NOTE — ED PROVIDER NOTE - PRO INTERPRETER NEED 2
[Chaperone Present] : A chaperone was present in the examining room during all aspects of the physical examination [[id number]] : A chaperone was present during the pelvic exam. [Appropriately responsive] : appropriately responsive [Alert] : alert [No Acute Distress] : no acute distress [No Lymphadenopathy] : no lymphadenopathy [Soft] : soft English [Non-tender] : non-tender [Non-distended] : non-distended [Oriented x3] : oriented x3 [Examination Of The Breasts] : a normal appearance [No Discharge] : no discharge [No Masses] : no breast masses were palpable [Labia Majora] : normal [Labia Minora] : normal [No Bleeding] : There was no active vaginal bleeding [Normal] : normal [Uterine Adnexae] : non-palpable [FreeTextEntry2] : alo

## 2024-05-22 ENCOUNTER — APPOINTMENT (OUTPATIENT)
Dept: COLORECTAL SURGERY | Facility: CLINIC | Age: 59
End: 2024-05-22
Payer: COMMERCIAL

## 2024-05-22 VITALS
BODY MASS INDEX: 31.1 KG/M2 | WEIGHT: 210 LBS | SYSTOLIC BLOOD PRESSURE: 126 MMHG | HEART RATE: 80 BPM | DIASTOLIC BLOOD PRESSURE: 79 MMHG | HEIGHT: 69 IN | TEMPERATURE: 98.3 F

## 2024-05-22 PROCEDURE — 99024 POSTOP FOLLOW-UP VISIT: CPT

## 2024-05-22 NOTE — HISTORY OF PRESENT ILLNESS
[FreeTextEntry1] : 59F with a symptomatic fibroid uterus that underwent a TLH BS on 4/19/24. Her course was complicated by an early post operative SBO, requiring admission and decompression. She improved quickly with the NGT decompression and was discharged. She re-presented within a few days with a recurrent early postoperative SBO. She did not improve as well with NGT decompression and was subsequently taken to the OR for a diagnostic laparoscopy for lysis of adhesions. She was found to have mixed acute and chronic pelvic adhesions that were lysed. Post operatively she recovered expectantly and was discharged home. Today she presents for interval follow up and reports overall feeling well. She has required the use of intermittent laxatives due to her lack of fiber in her current restricted diet however with use of laxatives has had cathartic BMs. Her appetite has returned and she has tolerated her low fiber diet without nausea or cramping. Mild bloating with cheese.   OB/GYN Hx: G1 - ectopic pregnancy.  PMHx: multiple myeloma, stopped medications 4/4 prior to surgery SHx: UAE, L/S salpingectomy, cervical vertebral fusion, 4/19/24 [facility] BS Meds: reglamid  10 mg, daratumumab, ASA 81, benadryl for insomnia Allergies: NKDA:  Cscope: April 2021, WNL

## 2024-05-22 NOTE — PHYSICAL EXAM
[JVD] : no jugular venous distention  [Normal Breath Sounds] : Normal breath sounds [No Rash or Lesion] : No rash or lesion [Alert] : alert [Calm] : calm [de-identified] : Abd is soft, nontender, nondistended. Well healing lap incisions that are c/d/i. No appreciable ventral hernia with valsalva.  [de-identified] : Well appearing female in NAD [de-identified] : MMM [de-identified] : ROM WNL

## 2024-05-22 NOTE — ASSESSMENT
[FreeTextEntry1] : 59F with a post operative SBO requiring operative adhesiolysis for resolution due to acute on chronic scarring. Recovering well.

## 2024-06-04 ENCOUNTER — APPOINTMENT (OUTPATIENT)
Dept: OBGYN | Facility: CLINIC | Age: 59
End: 2024-06-04
Payer: COMMERCIAL

## 2024-06-04 VITALS
SYSTOLIC BLOOD PRESSURE: 115 MMHG | DIASTOLIC BLOOD PRESSURE: 78 MMHG | HEART RATE: 74 BPM | WEIGHT: 210 LBS | OXYGEN SATURATION: 100 % | HEIGHT: 69 IN | BODY MASS INDEX: 31.1 KG/M2

## 2024-06-04 DIAGNOSIS — Z90.710 ACQUIRED ABSENCE OF BOTH CERVIX AND UTERUS: ICD-10-CM

## 2024-06-04 PROCEDURE — 99024 POSTOP FOLLOW-UP VISIT: CPT

## 2024-06-04 NOTE — DISCUSSION/SUMMARY
[FreeTextEntry1] : 59 year old s/p TOTAL LAPAROSCOPIC HYSTERECTOMY, BILATERAL SALPINGECTOMY, ENTEROLYSIS AND CYSTOSCOPY on 4/19/24 complicated by bowel obstruction and re-operation on 4/28/24 with extensive enterolysis and appendectomy presents for postoperative visit doing well  -resume all activity without limitation -resume routine gyn care  -follow up PRN

## 2024-06-04 NOTE — HISTORY OF PRESENT ILLNESS
[FreeTextEntry1] : 59 year old presents for postoperative visit s/p TOTAL LAPAROSCOPIC HYSTERECTOMY, BILATERAL SALPINGECTOMY, ENTEROLYSIS AND CYSTOSCOPY on 4/19/24 complicated by bowel obstruction and re-operation on 4/28/24 with extensive enterolysis and appendectomy.  She reports regular voiding and bowel movements, denies fevers/chills/dysuria. Not taking pain medication. Still on low residue diet doing well. Patient reports last week she went to see her PCP for burning in her belly button incision. She was given Muprocin cream to use 2x day and she feels it has improved her symptoms.

## 2024-06-04 NOTE — PHYSICAL EXAM
[Appropriately responsive] : appropriately responsive [Alert] : alert [No Acute Distress] : no acute distress [Soft] : soft [Non-tender] : non-tender [Oriented x3] : oriented x3 [FreeTextEntry7] : incisions clean, dry, intact  [Labia Majora] : normal [Labia Minora] : normal [Normal] : normal [Absent] : absent [Uterine Adnexae] : absent [FreeTextEntry4] : cuff well healed

## 2024-06-26 ENCOUNTER — APPOINTMENT (OUTPATIENT)
Dept: COLORECTAL SURGERY | Facility: CLINIC | Age: 59
End: 2024-06-26
Payer: COMMERCIAL

## 2024-06-26 VITALS
HEIGHT: 69 IN | BODY MASS INDEX: 32.14 KG/M2 | SYSTOLIC BLOOD PRESSURE: 125 MMHG | HEART RATE: 64 BPM | TEMPERATURE: 97.5 F | WEIGHT: 217 LBS | DIASTOLIC BLOOD PRESSURE: 79 MMHG

## 2024-06-26 DIAGNOSIS — K56.50 INTESTINAL ADHESIONS [BANDS], UNSPECIFIED AS TO PARTIAL VERSUS COMPLETE OBSTRUCTION: ICD-10-CM

## 2024-06-26 PROCEDURE — 99212 OFFICE O/P EST SF 10 MIN: CPT | Mod: 24

## 2024-06-26 RX ORDER — LENALIDOMIDE 10 MG/1
10 CAPSULE ORAL
Refills: 0 | Status: ACTIVE | COMMUNITY

## 2024-06-26 RX ORDER — DEXAMETHASONE 4 MG/1
4 TABLET ORAL
Refills: 0 | Status: ACTIVE | COMMUNITY

## 2024-09-27 ENCOUNTER — APPOINTMENT (OUTPATIENT)
Dept: ORTHOPEDIC SURGERY | Facility: CLINIC | Age: 59
End: 2024-09-27
Payer: COMMERCIAL

## 2024-09-27 VITALS — BODY MASS INDEX: 31.1 KG/M2 | WEIGHT: 210 LBS | HEIGHT: 69 IN

## 2024-09-27 DIAGNOSIS — M75.102 UNSPECIFIED ROTATOR CUFF TEAR OR RUPTURE OF LEFT SHOULDER, NOT SPECIFIED AS TRAUMATIC: ICD-10-CM

## 2024-09-27 DIAGNOSIS — M75.52 BURSITIS OF LEFT SHOULDER: ICD-10-CM

## 2024-09-27 PROCEDURE — 73030 X-RAY EXAM OF SHOULDER: CPT | Mod: LT

## 2024-09-27 PROCEDURE — 73010 X-RAY EXAM OF SHOULDER BLADE: CPT | Mod: LT

## 2024-09-27 PROCEDURE — 99204 OFFICE O/P NEW MOD 45 MIN: CPT

## 2024-09-27 RX ORDER — METHYLPREDNISOLONE 4 MG/1
4 TABLET ORAL
Qty: 1 | Refills: 0 | Status: ACTIVE | COMMUNITY
Start: 2024-09-27 | End: 1900-01-01

## 2024-09-27 NOTE — IMAGING
[de-identified] : LEFT SHOULDER  Inspection: No swelling.   Palpation: Tenderness is noted at the bicipital groove, anterior and lateral.   Range of motion: There is pain with range of motion.  , ER 55, @90ER 90, @90IR 30  Strength: There is pain and discomfort with strength testing.  Forward Flexion 4/5. Abduction 4/5.  External Rotation 5-/5 and Internal Rotation 5/5   Neurological testings: motor and sensor intact distally.  Ligament Stability and Special Tests:   There is positive arc of pain.   Shoulder apprehension: neg  Shoulder relocation: neg  Obriens test: pos  Biceps Active test: neg  German Labral Shear: neg  Impingement testing: pos  Mikki testing: pos  Whipple: pos  Cross Body Adduction: neg

## 2024-09-27 NOTE — DATA REVIEWED
[FreeTextEntry1] : Left X-Ray Examination of the SHOULDER 2 views:  no fractures, subluxations or dislocations.   X-Ray Examination of the SCAPULA 1 or 2 views shows: there is an acromial spur

## 2024-09-27 NOTE — HISTORY OF PRESENT ILLNESS
[de-identified] : Date of Injury/Onset: August 2024 Pain: At Rest: 2 With Activity: 7 Mechanism of injury: This is NOT a Work Related Injury being treated under Worker's Compensation. This is NOT an athletic injury occurring associated with an interscholastic or organized sports team. Quality of symptoms: Improves with: Worse with:  Prior treatment: Prior Imaging: no Reports Available For Review Today: no Out of work/sport: not working    09/27/2024 MARQUIS she is here today for evaluation of left shoulder pain. Patient reports back in August 2024 she was laying down and turned to her left side heard a "pop". Patient visited her PCP, no x-rays were taken. Patient denies N/T or weakness however endorses some soreness, dull/aching pain. Patient notes pain is localized. Patient states pain is worse when lifting lateral side, reports limited ROM. Patient had been taken Tylenol, applied heating pads for pain relief, states nothing helps.  Patient reports left knee pain, pain began 1 week ago. Pain is localized, denies LONG. Pain is worse when standing and walking. Reports some sharp pain.

## 2024-09-27 NOTE — DISCUSSION/SUMMARY
[Medication Risks Reviewed] : Medication risks reviewed [de-identified] : We discussed their diagnosis and treatment options at length. We will first attempt conservative treatment with a course of PT and anti-inflammatory medication. The patient was provided with a prescription to work on scapular strengthening and rotator cuff strengthening on the impingement syndrome protocol. We also discussed the possible of a corticosteroid injection in the future in order to help decrease inflammation and pain so that they can perform better therapy.    Follow up in 6 weeks to re-evaluate progress with therapy  on chemo for multiple myeloma rec: antiinflammatory diet, liposomal tumeric

## 2024-11-08 ENCOUNTER — APPOINTMENT (OUTPATIENT)
Dept: ORTHOPEDIC SURGERY | Facility: CLINIC | Age: 59
End: 2024-11-08
Payer: COMMERCIAL

## 2024-11-08 DIAGNOSIS — M75.102 UNSPECIFIED ROTATOR CUFF TEAR OR RUPTURE OF LEFT SHOULDER, NOT SPECIFIED AS TRAUMATIC: ICD-10-CM

## 2024-11-08 DIAGNOSIS — M75.52 BURSITIS OF LEFT SHOULDER: ICD-10-CM

## 2024-11-08 PROCEDURE — 99214 OFFICE O/P EST MOD 30 MIN: CPT

## 2024-11-08 RX ORDER — MELOXICAM 7.5 MG/1
7.5 TABLET ORAL
Qty: 30 | Refills: 0 | Status: ACTIVE | COMMUNITY
Start: 2024-11-08 | End: 1900-01-01

## 2024-11-13 ENCOUNTER — APPOINTMENT (OUTPATIENT)
Dept: MRI IMAGING | Facility: CLINIC | Age: 59
End: 2024-11-13

## 2024-11-13 ENCOUNTER — TRANSCRIPTION ENCOUNTER (OUTPATIENT)
Age: 59
End: 2024-11-13

## 2024-11-13 PROCEDURE — 73221 MRI JOINT UPR EXTREM W/O DYE: CPT | Mod: LT

## 2024-11-22 ENCOUNTER — APPOINTMENT (OUTPATIENT)
Dept: ORTHOPEDIC SURGERY | Facility: CLINIC | Age: 59
End: 2024-11-22
Payer: COMMERCIAL

## 2024-11-22 DIAGNOSIS — M75.52 BURSITIS OF LEFT SHOULDER: ICD-10-CM

## 2024-11-22 DIAGNOSIS — M75.102 UNSPECIFIED ROTATOR CUFF TEAR OR RUPTURE OF LEFT SHOULDER, NOT SPECIFIED AS TRAUMATIC: ICD-10-CM

## 2024-11-22 PROCEDURE — 99214 OFFICE O/P EST MOD 30 MIN: CPT

## 2025-01-03 ENCOUNTER — APPOINTMENT (OUTPATIENT)
Dept: ORTHOPEDIC SURGERY | Facility: CLINIC | Age: 60
End: 2025-01-03
Payer: COMMERCIAL

## 2025-01-03 DIAGNOSIS — M75.52 BURSITIS OF LEFT SHOULDER: ICD-10-CM

## 2025-01-03 DIAGNOSIS — M75.102 UNSPECIFIED ROTATOR CUFF TEAR OR RUPTURE OF LEFT SHOULDER, NOT SPECIFIED AS TRAUMATIC: ICD-10-CM

## 2025-01-03 PROCEDURE — 99213 OFFICE O/P EST LOW 20 MIN: CPT

## 2025-02-03 ENCOUNTER — APPOINTMENT (OUTPATIENT)
Dept: ORTHOPEDIC SURGERY | Facility: CLINIC | Age: 60
End: 2025-02-03
Payer: COMMERCIAL

## 2025-02-03 DIAGNOSIS — M75.102 UNSPECIFIED ROTATOR CUFF TEAR OR RUPTURE OF LEFT SHOULDER, NOT SPECIFIED AS TRAUMATIC: ICD-10-CM

## 2025-02-03 DIAGNOSIS — M75.52 BURSITIS OF LEFT SHOULDER: ICD-10-CM

## 2025-02-03 PROCEDURE — 99213 OFFICE O/P EST LOW 20 MIN: CPT | Mod: 25

## 2025-02-03 PROCEDURE — J3490M: CUSTOM

## 2025-02-03 PROCEDURE — 20610 DRAIN/INJ JOINT/BURSA W/O US: CPT | Mod: LT

## 2025-03-14 ENCOUNTER — APPOINTMENT (OUTPATIENT)
Dept: ORTHOPEDIC SURGERY | Facility: CLINIC | Age: 60
End: 2025-03-14
Payer: COMMERCIAL

## 2025-03-14 DIAGNOSIS — M75.52 BURSITIS OF LEFT SHOULDER: ICD-10-CM

## 2025-03-14 DIAGNOSIS — M75.102 UNSPECIFIED ROTATOR CUFF TEAR OR RUPTURE OF LEFT SHOULDER, NOT SPECIFIED AS TRAUMATIC: ICD-10-CM

## 2025-03-14 PROCEDURE — 99213 OFFICE O/P EST LOW 20 MIN: CPT

## 2025-05-06 NOTE — DISCHARGE NOTE NURSING/CASE MANAGEMENT/SOCIAL WORK - NSTRANSFERBELONGINGSRESP_GEN_A_NUR
Surgeon Performing The Repair (Optional): Ani Reis Biopsy Photograph Reviewed: Yes Size Of Lesion In Cm: 0.5 X Size Of Lesion In Cm (Optional): 0 Size Of Margin In Cm: 0.4 yes Anesthesia Volume In Cc: 8 Was An Eye Clamp Used?: No Eye Clamp Note Details: An eye clamp was used during the procedure. Excision Method: Fusiform Saucerization Depth: dermis and superficial adipose tissue Repair Type: Intermediate Intermediate / Complex Repair - Final Wound Length In Cm: 4.5 Deep Sutures: 4-0 Vicryl Epidermal Sutures: 4-0 Prolene Suture Removal: 14 days Suturegard Retention Suture: 2-0 Nylon Retention Suture Bite Size: 3 mm Length To Time In Minutes Device Was In Place: 10 Number Of Hemigard Strips Per Side: 1 Undermining Type: Entire Wound Debridement Text: The wound edges were debrided prior to proceeding with the closure to facilitate wound healing. Helical Rim Text: The closure involved the helical rim. Vermilion Border Text: The closure involved the vermilion border. Nostril Rim Text: The closure involved the nostril rim. Retention Suture Text: Retention sutures were placed to support the closure and prevent dehiscence. Lab: -[id number] Graft Donor Site Bandage (Optional-Leave Blank If You Don't Want In Note): Steri-strips and a pressure bandage were applied to the donor site. Epidermal Closure Graft Donor Site (Optional): simple interrupted Billing Type: Third-Party Bill Excision Depth: adipose tissue Scalpel Size: 15 blade Anesthesia Type: 1% Xylocaine with epinephrine Additional Anesthesia Volume In Cc: 6 Hemostasis: Electrocautery Estimated Blood Loss (Cc): minimal Detail Level: Detailed Repair Depth: use same depth as excision depth Anesthesia Type: 1% lidocaine with epinephrine Number Of Deep Sutures (Optional): 4 Number Of Epidermal Sutures (Optional): 7 Wound Care: Aquaphor Dressing: dry sterile dressing Suturegard Intro: Intraoperative tissue expansion was performed, utilizing the SUTUREGARD device, in order to reduce wound tension. Suturegard Body: The suture ends were repeatedly re-tightened and re-clamped to achieve the desired tissue expansion. Hemigard Intro: Due to skin fragility and wound tension, it was decided to use HEMIGARD adhesive retention suture devices to permit a linear closure. The skin was cleaned and dried for a 6cm distance away from the wound. Excessive hair, if present, was removed to allow for adhesion. Hemigard Postcare Instructions: The HEMIGARD strips are to remain completely dry for at least 5-7 days. Positioning (Leave Blank If You Do Not Want): The patient was placed in a comfortable position exposing the surgical site. Pre-Excision Curettage Text (Leave Blank If You Do Not Want): Prior to drawing the surgical margin the visible lesion was removed with electrodesiccation and curettage to clearly define the lesion size. Complex Repair Preamble Text (Leave Blank If You Do Not Want): Extensive wide undermining was performed. Intermediate Repair Preamble Text (Leave Blank If You Do Not Want): Undermining was performed with blunt dissection. Curvilinear Excision Additional Text (Leave Blank If You Do Not Want): The margin was drawn around the clinically apparent lesion.  A curvilinear shape was then drawn on the skin incorporating the lesion and margins.  Incisions were then made along these lines to the appropriate tissue plane and the lesion was extirpated. Fusiform Excision Additional Text (Leave Blank If You Do Not Want): The margin was drawn around the clinically apparent lesion.  A fusiform shape was then drawn on the skin incorporating the lesion and margins.  Incisions were then made along these lines to the appropriate tissue plane and the lesion was extirpated. Elliptical Excision Additional Text (Leave Blank If You Do Not Want): The margin was drawn around the clinically apparent lesion.  An elliptical shape was then drawn on the skin incorporating the lesion and margins.  Incisions were then made along these lines to the appropriate tissue plane and the lesion was extirpated. Saucerization Excision Additional Text (Leave Blank If You Do Not Want): The margin was drawn around the clinically apparent lesion.  Incisions were then made along these lines, in a tangential fashion, to the appropriate tissue plane and the lesion was extirpated. Slit Excision Additional Text (Leave Blank If You Do Not Want): A linear line was drawn on the skin overlying the lesion. An incision was made slowly until the lesion was visualized.  Once visualized, the lesion was removed with blunt dissection. Excisional Biopsy Additional Text (Leave Blank If You Do Not Want): The margin was drawn around the clinically apparent lesion. An elliptical shape was then drawn on the skin incorporating the lesion and margins.  Incisions were then made along these lines to the appropriate tissue plane and the lesion was extirpated. Perilesional Excision Additional Text (Leave Blank If You Do Not Want): The margin was drawn around the clinically apparent lesion. Incisions were then made along these lines to the appropriate tissue plane and the lesion was extirpated. Repair Performed By Another Provider Text (Leave Blank If You Do Not Want): After the tissue was excised the defect was repaired by another provider. No Repair - Repaired With Adjacent Surgical Defect Text (Leave Blank If You Do Not Want): After the excision the defect was repaired concurrently with another surgical defect which was in close approximation. Adjacent Tissue Transfer Text: The defect edges were debeveled with a #15 scalpel blade. Given the location of the defect and the proximity to free margins an adjacent tissue transfer was deemed most appropriate. Using a sterile surgical marker, an appropriate flap was drawn incorporating the defect and placing the expected incisions within the relaxed skin tension lines where possible. The area thus outlined was incised deep to adipose tissue with a #15 scalpel blade. The skin margins were undermined to an appropriate distance in all directions utilizing iris scissors. Advancement Flap (Single) Text: The defect edges were debeveled with a #15 scalpel blade.  Given the location of the defect and the proximity to free margins a single advancement flap was deemed most appropriate.  Using a sterile surgical marker, an appropriate advancement flap was drawn incorporating the defect and placing the expected incisions within the relaxed skin tension lines where possible.    The area thus outlined was incised deep to adipose tissue with a #15 scalpel blade.  The skin margins were undermined to an appropriate distance in all directions utilizing iris scissors. Advancement Flap (Double) Text: The defect edges were debeveled with a #15 scalpel blade.  Given the location of the defect and the proximity to free margins a double advancement flap was deemed most appropriate.  Using a sterile surgical marker, the appropriate advancement flaps were drawn incorporating the defect and placing the expected incisions within the relaxed skin tension lines where possible.    The area thus outlined was incised deep to adipose tissue with a #15 scalpel blade.  The skin margins were undermined to an appropriate distance in all directions utilizing iris scissors. Burow's Advancement Flap Text: The defect edges were debeveled with a #15 scalpel blade.  Given the location of the defect and the proximity to free margins a Burow's advancement flap was deemed most appropriate.  Using a sterile surgical marker, the appropriate advancement flap was drawn incorporating the defect and placing the expected incisions within the relaxed skin tension lines where possible.    The area thus outlined was incised deep to adipose tissue with a #15 scalpel blade.  The skin margins were undermined to an appropriate distance in all directions utilizing iris scissors. Chonodrocutaneous Helical Advancement Flap Text: The defect edges were debeveled with a #15 scalpel blade.  Given the location of the defect and the proximity to free margins a chondrocutaneous helical advancement flap was deemed most appropriate.  Using a sterile surgical marker, the appropriate advancement flap was drawn incorporating the defect and placing the expected incisions within the relaxed skin tension lines where possible.    The area thus outlined was incised deep to adipose tissue with a #15 scalpel blade.  The skin margins were undermined to an appropriate distance in all directions utilizing iris scissors. Crescentic Advancement Flap Text: The defect edges were debeveled with a #15 scalpel blade.  Given the location of the defect and the proximity to free margins a crescentic advancement flap was deemed most appropriate.  Using a sterile surgical marker, the appropriate advancement flap was drawn incorporating the defect and placing the expected incisions within the relaxed skin tension lines where possible.    The area thus outlined was incised deep to adipose tissue with a #15 scalpel blade.  The skin margins were undermined to an appropriate distance in all directions utilizing iris scissors. A-T Advancement Flap Text: The defect edges were debeveled with a #15 scalpel blade.  Given the location of the defect, shape of the defect and the proximity to free margins an A-T advancement flap was deemed most appropriate.  Using a sterile surgical marker, an appropriate advancement flap was drawn incorporating the defect and placing the expected incisions within the relaxed skin tension lines where possible.    The area thus outlined was incised deep to adipose tissue with a #15 scalpel blade.  The skin margins were undermined to an appropriate distance in all directions utilizing iris scissors. O-T Advancement Flap Text: The defect edges were debeveled with a #15 scalpel blade.  Given the location of the defect, shape of the defect and the proximity to free margins an O-T advancement flap was deemed most appropriate.  Using a sterile surgical marker, an appropriate advancement flap was drawn incorporating the defect and placing the expected incisions within the relaxed skin tension lines where possible.    The area thus outlined was incised deep to adipose tissue with a #15 scalpel blade.  The skin margins were undermined to an appropriate distance in all directions utilizing iris scissors. O-L Flap Text: The defect edges were debeveled with a #15 scalpel blade.  Given the location of the defect, shape of the defect and the proximity to free margins an O-L flap was deemed most appropriate.  Using a sterile surgical marker, an appropriate advancement flap was drawn incorporating the defect and placing the expected incisions within the relaxed skin tension lines where possible.    The area thus outlined was incised deep to adipose tissue with a #15 scalpel blade.  The skin margins were undermined to an appropriate distance in all directions utilizing iris scissors. O-Z Flap Text: The defect edges were debeveled with a #15 scalpel blade.  Given the location of the defect, shape of the defect and the proximity to free margins an O-Z flap was deemed most appropriate.  Using a sterile surgical marker, an appropriate transposition flap was drawn incorporating the defect and placing the expected incisions within the relaxed skin tension lines where possible. The area thus outlined was incised deep to adipose tissue with a #15 scalpel blade.  The skin margins were undermined to an appropriate distance in all directions utilizing iris scissors. Double O-Z Flap Text: The defect edges were debeveled with a #15 scalpel blade.  Given the location of the defect, shape of the defect and the proximity to free margins a Double O-Z flap was deemed most appropriate.  Using a sterile surgical marker, an appropriate transposition flap was drawn incorporating the defect and placing the expected incisions within the relaxed skin tension lines where possible. The area thus outlined was incised deep to adipose tissue with a #15 scalpel blade.  The skin margins were undermined to an appropriate distance in all directions utilizing iris scissors. V-Y Flap Text: The defect edges were debeveled with a #15 scalpel blade.  Given the location of the defect, shape of the defect and the proximity to free margins a V-Y flap was deemed most appropriate.  Using a sterile surgical marker, an appropriate advancement flap was drawn incorporating the defect and placing the expected incisions within the relaxed skin tension lines where possible.    The area thus outlined was incised deep to adipose tissue with a #15 scalpel blade.  The skin margins were undermined to an appropriate distance in all directions utilizing iris scissors. Advancement-Rotation Flap Text: The defect edges were debeveled with a #15 scalpel blade.  Given the location of the defect, shape of the defect and the proximity to free margins an advancement-rotation flap was deemed most appropriate.  Using a sterile surgical marker, an appropriate flap was drawn incorporating the defect and placing the expected incisions within the relaxed skin tension lines where possible. The area thus outlined was incised deep to adipose tissue with a #15 scalpel blade.  The skin margins were undermined to an appropriate distance in all directions utilizing iris scissors. Mercedes Flap Text: The defect edges were debeveled with a #15 scalpel blade.  Given the location of the defect, shape of the defect and the proximity to free margins a Mercedes flap was deemed most appropriate.  Using a sterile surgical marker, an appropriate advancement flap was drawn incorporating the defect and placing the expected incisions within the relaxed skin tension lines where possible. The area thus outlined was incised deep to adipose tissue with a #15 scalpel blade.  The skin margins were undermined to an appropriate distance in all directions utilizing iris scissors. Modified Advancement Flap Text: The defect edges were debeveled with a #15 scalpel blade.  Given the location of the defect, shape of the defect and the proximity to free margins a modified advancement flap was deemed most appropriate.  Using a sterile surgical marker, an appropriate advancement flap was drawn incorporating the defect and placing the expected incisions within the relaxed skin tension lines where possible.    The area thus outlined was incised deep to adipose tissue with a #15 scalpel blade.  The skin margins were undermined to an appropriate distance in all directions utilizing iris scissors. Mucosal Advancement Flap Text: Given the location of the defect, shape of the defect and the proximity to free margins a mucosal advancement flap was deemed most appropriate. Incisions were made with a 15 blade scalpel in the appropriate fashion along the cutaneous vermilion border and the mucosal lip. The remaining actinically damaged mucosal tissue was excised.  The mucosal advancement flap was then elevated to the gingival sulcus with care taken to preserve the neurovascular structures and advanced into the primary defect. Care was taken to ensure that precise realignment of the vermilion border was achieved. Peng Advancement Flap Text: The defect edges were debeveled with a #15 scalpel blade.  Given the location of the defect, shape of the defect and the proximity to free margins a Peng advancement flap was deemed most appropriate.  Using a sterile surgical marker, an appropriate advancement flap was drawn incorporating the defect and placing the expected incisions within the relaxed skin tension lines where possible. The area thus outlined was incised deep to adipose tissue with a #15 scalpel blade.  The skin margins were undermined to an appropriate distance in all directions utilizing iris scissors. Hatchet Flap Text: The defect edges were debeveled with a #15 scalpel blade.  Given the location of the defect, shape of the defect and the proximity to free margins a hatchet flap was deemed most appropriate.  Using a sterile surgical marker, an appropriate hatchet flap was drawn incorporating the defect and placing the expected incisions within the relaxed skin tension lines where possible.    The area thus outlined was incised deep to adipose tissue with a #15 scalpel blade.  The skin margins were undermined to an appropriate distance in all directions utilizing iris scissors. Rotation Flap Text: The defect edges were debeveled with a #15 scalpel blade.  Given the location of the defect, shape of the defect and the proximity to free margins a rotation flap was deemed most appropriate.  Using a sterile surgical marker, an appropriate rotation flap was drawn incorporating the defect and placing the expected incisions within the relaxed skin tension lines where possible.    The area thus outlined was incised deep to adipose tissue with a #15 scalpel blade.  The skin margins were undermined to an appropriate distance in all directions utilizing iris scissors. Bilateral Rotation Flap Text: The defect edges were debeveled with a #15 scalpel blade. Given the location of the defect, shape of the defect and the proximity to free margins a bilateral rotation flap was deemed most appropriate. Using a sterile surgical marker, an appropriate rotation flap was drawn incorporating the defect and placing the expected incisions within the relaxed skin tension lines where possible. The area thus outlined was incised deep to adipose tissue with a #15 scalpel blade. The skin margins were undermined to an appropriate distance in all directions utilizing iris scissors. Following this, the designed flap was carried over into the primary defect and sutured into place. Spiral Flap Text: The defect edges were debeveled with a #15 scalpel blade.  Given the location of the defect, shape of the defect and the proximity to free margins a spiral flap was deemed most appropriate.  Using a sterile surgical marker, an appropriate rotation flap was drawn incorporating the defect and placing the expected incisions within the relaxed skin tension lines where possible. The area thus outlined was incised deep to adipose tissue with a #15 scalpel blade.  The skin margins were undermined to an appropriate distance in all directions utilizing iris scissors. Staged Advancement Flap Text: The defect edges were debeveled with a #15 scalpel blade. Given the location of the defect, shape of the defect and the proximity to free margins a staged advancement flap was deemed most appropriate. Using a sterile surgical marker, an appropriate advancement flap was drawn incorporating the defect and placing the expected incisions within the relaxed skin tension lines where possible. The area thus outlined was incised deep to adipose tissue with a #15 scalpel blade. The skin margins were undermined to an appropriate distance in all directions utilizing iris scissors. Following this, the designed flap was placed into the primary defect and sutured into place. Star Wedge Flap Text: The defect edges were debeveled with a #15 scalpel blade.  Given the location of the defect, shape of the defect and the proximity to free margins a star wedge flap was deemed most appropriate.  Using a sterile surgical marker, an appropriate rotation flap was drawn incorporating the defect and placing the expected incisions within the relaxed skin tension lines where possible. The area thus outlined was incised deep to adipose tissue with a #15 scalpel blade.  The skin margins were undermined to an appropriate distance in all directions utilizing iris scissors. Transposition Flap Text: The defect edges were debeveled with a #15 scalpel blade.  Given the location of the defect and the proximity to free margins a transposition flap was deemed most appropriate.  Using a sterile surgical marker, an appropriate transposition flap was drawn incorporating the defect.    The area thus outlined was incised deep to adipose tissue with a #15 scalpel blade.  The skin margins were undermined to an appropriate distance in all directions utilizing iris scissors. Muscle Hinge Flap Text: The defect edges were debeveled with a #15 scalpel blade.  Given the size, depth and location of the defect and the proximity to free margins a muscle hinge flap was deemed most appropriate.  Using a sterile surgical marker, an appropriate hinge flap was drawn incorporating the defect. The area thus outlined was incised with a #15 scalpel blade.  The skin margins were undermined to an appropriate distance in all directions utilizing iris scissors. Mustarde Flap Text: The defect edges were debeveled with a #15 scalpel blade.  Given the size, depth and location of the defect and the proximity to free margins a Mustarde flap was deemed most appropriate. Using a sterile surgical marker, an appropriate flap was drawn incorporating the defect. The area thus outlined was incised with a #15 scalpel blade. The skin margins were undermined to an appropriate distance in all directions utilizing iris scissors. Following this, the designed flap was placed in the primary defect and sutured into place. Nasal Turnover Hinge Flap Text: The defect edges were debeveled with a #15 scalpel blade.  Given the size, depth, location of the defect and the defect being full thickness a nasal turnover hinge flap was deemed most appropriate. Using a sterile surgical marker, an appropriate hinge flap was drawn incorporating the defect. The area thus outlined was incised with a #15 scalpel blade. The flap was designed to recreate the nasal mucosal lining and the alar rim. The skin margins were undermined to an appropriate distance in all directions utilizing iris scissors. Following this, the designed flap was placed into the primary defect and sutured into place Nasalis-Muscle-Based Myocutaneous Island Pedicle Flap Text: Using a #15 blade, an incision was made around the donor flap to the level of the nasalis muscle. Wide lateral undermining was then performed in both the subcutaneous plane above the nasalis muscle, and in a submuscular plane just above periosteum. This allowed the formation of a free nasalis muscle axial pedicle (based on the angular artery) which was still attached to the actual cutaneous flap, increasing its mobility and vascular viability. Hemostasis was obtained with pinpoint electrocoagulation. The flap was mobilized into position and the pivotal anchor points positioned and stabilized with buried interrupted sutures. Subcutaneous and dermal tissues were closed in a multilayered fashion with sutures. Tissue redundancies were excised, and the epidermal edges were apposed without significant tension and sutured with sutures. Nasalis Myocutaneous Flap Text: Using a #15 blade, an incision was made around the donor flap to the level of the nasalis muscle. Wide lateral undermining was then performed in both the subcutaneous plane above the nasalis muscle, and in a submuscular plane just above periosteum. This allowed the formation of a free nasalis muscle axial pedicle which was still attached to the actual cutaneous flap, increasing its mobility and vascular viability. Hemostasis was obtained with pinpoint electrocoagulation. The flap was mobilized into position and the pivotal anchor points positioned and stabilized with buried interrupted sutures. Subcutaneous and dermal tissues were closed in a multilayered fashion with sutures. Tissue redundancies were excised, and the epidermal edges were apposed without significant tension and sutured with sutures. Nasolabial Transposition Flap Text: The defect edges were debeveled with a #15 scalpel blade.  Given the size, depth and location of the defect and the proximity to free margins a nasolabial transposition flap was deemed most appropriate. Using a sterile surgical marker, an appropriate flap was drawn incorporating the defect. The area thus outlined was incised with a #15 scalpel blade. The skin margins were undermined to an appropriate distance in all directions utilizing iris scissors. Following this, the designed flap was carried into the primary defect and sutured into place. Orbicularis Oris Muscle Flap Text: The defect edges were debeveled with a #15 scalpel blade.  Given that the defect affected the competency of the oral sphincter an orbicularis oris muscle flap was deemed most appropriate to restore this competency and normal muscle function.  Using a sterile surgical marker, an appropriate flap was drawn incorporating the defect. The area thus outlined was incised with a #15 scalpel blade. Following this, the designed flap was placed into the primary defect and sutured into place. Melolabial Transposition Flap Text: The defect edges were debeveled with a #15 scalpel blade.  Given the location of the defect and the proximity to free margins a melolabial flap was deemed most appropriate.  Using a sterile surgical marker, an appropriate melolabial transposition flap was drawn incorporating the defect.    The area thus outlined was incised deep to adipose tissue with a #15 scalpel blade.  The skin margins were undermined to an appropriate distance in all directions utilizing iris scissors. Rectangular Flap Text: The defect edges were debeveled with a #15 scalpel blade. Given the location of the defect and the proximity to free margins a rectangular flap was deemed most appropriate. Using a sterile surgical marker, an appropriate rectangular flap was drawn incorporating the defect. The area thus outlined was incised deep to adipose tissue with a #15 scalpel blade. The skin margins were undermined to an appropriate distance in all directions utilizing iris scissors. Following this, the designed flap was carried over into the primary defect and sutured into place. Rhombic Flap Text: The defect edges were debeveled with a #15 scalpel blade.  Given the location of the defect and the proximity to free margins a rhombic flap was deemed most appropriate.  Using a sterile surgical marker, an appropriate rhombic flap was drawn incorporating the defect.    The area thus outlined was incised deep to adipose tissue with a #15 scalpel blade.  The skin margins were undermined to an appropriate distance in all directions utilizing iris scissors. Rhomboid Transposition Flap Text: The defect edges were debeveled with a #15 scalpel blade.  Given the location of the defect and the proximity to free margins a rhomboid transposition flap was deemed most appropriate.  Using a sterile surgical marker, an appropriate rhomboid flap was drawn incorporating the defect.    The area thus outlined was incised deep to adipose tissue with a #15 scalpel blade.  The skin margins were undermined to an appropriate distance in all directions utilizing iris scissors. Bi-Rhombic Flap Text: The defect edges were debeveled with a #15 scalpel blade.  Given the location of the defect and the proximity to free margins a bi-rhombic flap was deemed most appropriate.  Using a sterile surgical marker, an appropriate rhombic flap was drawn incorporating the defect. The area thus outlined was incised deep to adipose tissue with a #15 scalpel blade.  The skin margins were undermined to an appropriate distance in all directions utilizing iris scissors. Helical Rim Advancement Flap Text: The defect edges were debeveled with a #15 blade scalpel.  Given the location of the defect and the proximity to free margins (helical rim) a double helical rim advancement flap was deemed most appropriate.  Using a sterile surgical marker, the appropriate advancement flaps were drawn incorporating the defect and placing the expected incisions between the helical rim and antihelix where possible.  The area thus outlined was incised through and through with a #15 scalpel blade.  With a skin hook and iris scissors, the flaps were gently and sharply undermined and freed up. Bilateral Helical Rim Advancement Flap Text: The defect edges were debeveled with a #15 blade scalpel.  Given the location of the defect and the proximity to free margins (helical rim) a bilateral helical rim advancement flap was deemed most appropriate.  Using a sterile surgical marker, the appropriate advancement flaps were drawn incorporating the defect and placing the expected incisions between the helical rim and antihelix where possible.  The area thus outlined was incised through and through with a #15 scalpel blade.  With a skin hook and iris scissors, the flaps were gently and sharply undermined and freed up. Ear Star Wedge Flap Text: The defect edges were debeveled with a #15 blade scalpel.  Given the location of the defect and the proximity to free margins (helical rim) an ear star wedge flap was deemed most appropriate.  Using a sterile surgical marker, the appropriate flap was drawn incorporating the defect and placing the expected incisions between the helical rim and antihelix where possible.  The area thus outlined was incised through and through with a #15 scalpel blade. Flip-Flop Flap Text: The defect edges were debeveled with a #15 blade scalpel.  Given the location of the defect and the proximity to free margins a flip-flop flap was deemed most appropriate. Using a sterile surgical marker, the appropriate flap was drawn incorporating the defect and placing the expected incisions between the helical rim and antihelix where possible.  The area thus outlined was incised through and through with a #15 scalpel blade. Following this, the designed flap was carried over into the primary defect and sutured into place. Banner Transposition Flap Text: The defect edges were debeveled with a #15 scalpel blade.  Given the location of the defect and the proximity to free margins a Banner transposition flap was deemed most appropriate.  Using a sterile surgical marker, an appropriate flap drawn around the defect. The area thus outlined was incised deep to adipose tissue with a #15 scalpel blade.  The skin margins were undermined to an appropriate distance in all directions utilizing iris scissors. Bilobed Flap Text: The defect edges were debeveled with a #15 scalpel blade.  Given the location of the defect and the proximity to free margins a bilobe flap was deemed most appropriate.  Using a sterile surgical marker, an appropriate bilobe flap drawn around the defect.    The area thus outlined was incised deep to adipose tissue with a #15 scalpel blade.  The skin margins were undermined to an appropriate distance in all directions utilizing iris scissors. Bilobed Transposition Flap Text: The defect edges were debeveled with a #15 scalpel blade.  Given the location of the defect and the proximity to free margins a bilobed transposition flap was deemed most appropriate.  Using a sterile surgical marker, an appropriate bilobe flap drawn around the defect.    The area thus outlined was incised deep to adipose tissue with a #15 scalpel blade.  The skin margins were undermined to an appropriate distance in all directions utilizing iris scissors. Trilobed Flap Text: The defect edges were debeveled with a #15 scalpel blade.  Given the location of the defect and the proximity to free margins a trilobed flap was deemed most appropriate.  Using a sterile surgical marker, an appropriate trilobed flap drawn around the defect.    The area thus outlined was incised deep to adipose tissue with a #15 scalpel blade.  The skin margins were undermined to an appropriate distance in all directions utilizing iris scissors. Dorsal Nasal Flap Text: The defect edges were debeveled with a #15 scalpel blade.  Given the location of the defect and the proximity to free margins a dorsal nasal flap was deemed most appropriate.  Using a sterile surgical marker, an appropriate dorsal nasal flap was drawn around the defect.    The area thus outlined was incised deep to adipose tissue with a #15 scalpel blade.  The skin margins were undermined to an appropriate distance in all directions utilizing iris scissors. Island Pedicle Flap Text: The defect edges were debeveled with a #15 scalpel blade.  Given the location of the defect, shape of the defect and the proximity to free margins an island pedicle advancement flap was deemed most appropriate.  Using a sterile surgical marker, an appropriate advancement flap was drawn incorporating the defect, outlining the appropriate donor tissue and placing the expected incisions within the relaxed skin tension lines where possible.    The area thus outlined was incised deep to adipose tissue with a #15 scalpel blade.  The skin margins were undermined to an appropriate distance in all directions around the primary defect and laterally outward around the island pedicle utilizing iris scissors.  There was minimal undermining beneath the pedicle flap. Island Pedicle Flap With Canthal Suspension Text: The defect edges were debeveled with a #15 scalpel blade.  Given the location of the defect, shape of the defect and the proximity to free margins an island pedicle advancement flap was deemed most appropriate.  Using a sterile surgical marker, an appropriate advancement flap was drawn incorporating the defect, outlining the appropriate donor tissue and placing the expected incisions within the relaxed skin tension lines where possible. The area thus outlined was incised deep to adipose tissue with a #15 scalpel blade.  The skin margins were undermined to an appropriate distance in all directions around the primary defect and laterally outward around the island pedicle utilizing iris scissors.  There was minimal undermining beneath the pedicle flap. A suspension suture was placed in the canthal tendon to prevent tension and prevent ectropion. Alar Island Pedicle Flap Text: The defect edges were debeveled with a #15 scalpel blade.  Given the location of the defect, shape of the defect and the proximity to the alar rim an island pedicle advancement flap was deemed most appropriate.  Using a sterile surgical marker, an appropriate advancement flap was drawn incorporating the defect, outlining the appropriate donor tissue and placing the expected incisions within the nasal ala running parallel to the alar rim. The area thus outlined was incised with a #15 scalpel blade.  The skin margins were undermined minimally to an appropriate distance in all directions around the primary defect and laterally outward around the island pedicle utilizing iris scissors.  There was minimal undermining beneath the pedicle flap. Double Island Pedicle Flap Text: The defect edges were debeveled with a #15 scalpel blade.  Given the location of the defect, shape of the defect and the proximity to free margins a double island pedicle advancement flap was deemed most appropriate.  Using a sterile surgical marker, an appropriate advancement flap was drawn incorporating the defect, outlining the appropriate donor tissue and placing the expected incisions within the relaxed skin tension lines where possible.    The area thus outlined was incised deep to adipose tissue with a #15 scalpel blade.  The skin margins were undermined to an appropriate distance in all directions around the primary defect and laterally outward around the island pedicle utilizing iris scissors.  There was minimal undermining beneath the pedicle flap. Island Pedicle Flap-Requiring Vessel Identification Text: The defect edges were debeveled with a #15 scalpel blade.  Given the location of the defect, shape of the defect and the proximity to free margins an island pedicle advancement flap was deemed most appropriate.  Using a sterile surgical marker, an appropriate advancement flap was drawn, based on the axial vessel mentioned above, incorporating the defect, outlining the appropriate donor tissue and placing the expected incisions within the relaxed skin tension lines where possible.    The area thus outlined was incised deep to adipose tissue with a #15 scalpel blade.  The skin margins were undermined to an appropriate distance in all directions around the primary defect and laterally outward around the island pedicle utilizing iris scissors.  There was minimal undermining beneath the pedicle flap. Keystone Flap Text: The defect edges were debeveled with a #15 scalpel blade.  Given the location of the defect, shape of the defect a keystone flap was deemed most appropriate.  Using a sterile surgical marker, an appropriate keystone flap was drawn incorporating the defect, outlining the appropriate donor tissue and placing the expected incisions within the relaxed skin tension lines where possible. The area thus outlined was incised deep to adipose tissue with a #15 scalpel blade.  The skin margins were undermined to an appropriate distance in all directions around the primary defect and laterally outward around the flap utilizing iris scissors. O-T Plasty Text: The defect edges were debeveled with a #15 scalpel blade.  Given the location of the defect, shape of the defect and the proximity to free margins an O-T plasty was deemed most appropriate.  Using a sterile surgical marker, an appropriate O-T plasty was drawn incorporating the defect and placing the expected incisions within the relaxed skin tension lines where possible.    The area thus outlined was incised deep to adipose tissue with a #15 scalpel blade.  The skin margins were undermined to an appropriate distance in all directions utilizing iris scissors. O-Z Plasty Text: The defect edges were debeveled with a #15 scalpel blade.  Given the location of the defect, shape of the defect and the proximity to free margins an O-Z plasty (double transposition flap) was deemed most appropriate.  Using a sterile surgical marker, the appropriate transposition flaps were drawn incorporating the defect and placing the expected incisions within the relaxed skin tension lines where possible.    The area thus outlined was incised deep to adipose tissue with a #15 scalpel blade.  The skin margins were undermined to an appropriate distance in all directions utilizing iris scissors.  Hemostasis was achieved with electrocautery.  The flaps were then transposed into place, one clockwise and the other counterclockwise, and anchored with interrupted buried subcutaneous sutures. Double O-Z Plasty Text: The defect edges were debeveled with a #15 scalpel blade.  Given the location of the defect, shape of the defect and the proximity to free margins a Double O-Z plasty (double transposition flap) was deemed most appropriate.  Using a sterile surgical marker, the appropriate transposition flaps were drawn incorporating the defect and placing the expected incisions within the relaxed skin tension lines where possible. The area thus outlined was incised deep to adipose tissue with a #15 scalpel blade.  The skin margins were undermined to an appropriate distance in all directions utilizing iris scissors.  Hemostasis was achieved with electrocautery.  The flaps were then transposed into place, one clockwise and the other counterclockwise, and anchored with interrupted buried subcutaneous sutures. V-Y Plasty Text: The defect edges were debeveled with a #15 scalpel blade.  Given the location of the defect, shape of the defect and the proximity to free margins an V-Y advancement flap was deemed most appropriate.  Using a sterile surgical marker, an appropriate advancement flap was drawn incorporating the defect and placing the expected incisions within the relaxed skin tension lines where possible.    The area thus outlined was incised deep to adipose tissue with a #15 scalpel blade.  The skin margins were undermined to an appropriate distance in all directions utilizing iris scissors. H Plasty Text: Given the location of the defect, shape of the defect and the proximity to free margins a H-plasty was deemed most appropriate for repair.  Using a sterile surgical marker, the appropriate advancement arms of the H-plasty were drawn incorporating the defect and placing the expected incisions within the relaxed skin tension lines where possible. The area thus outlined was incised deep to adipose tissue with a #15 scalpel blade. The skin margins were undermined to an appropriate distance in all directions utilizing iris scissors.  The opposing advancement arms were then advanced into place in opposite direction and anchored with interrupted buried subcutaneous sutures. W Plasty Text: The lesion was extirpated to the level of the fat with a #15 scalpel blade.  Given the location of the defect, shape of the defect and the proximity to free margins a W-plasty was deemed most appropriate for repair.  Using a sterile surgical marker, the appropriate transposition arms of the W-plasty were drawn incorporating the defect and placing the expected incisions within the relaxed skin tension lines where possible.    The area thus outlined was incised deep to adipose tissue with a #15 scalpel blade.  The skin margins were undermined to an appropriate distance in all directions utilizing iris scissors.  The opposing transposition arms were then transposed into place in opposite direction and anchored with interrupted buried subcutaneous sutures. Z Plasty Text: The lesion was extirpated to the level of the fat with a #15 scalpel blade.  Given the location of the defect, shape of the defect and the proximity to free margins a Z-plasty was deemed most appropriate for repair.  Using a sterile surgical marker, the appropriate transposition arms of the Z-plasty were drawn incorporating the defect and placing the expected incisions within the relaxed skin tension lines where possible.    The area thus outlined was incised deep to adipose tissue with a #15 scalpel blade.  The skin margins were undermined to an appropriate distance in all directions utilizing iris scissors.  The opposing transposition arms were then transposed into place in opposite direction and anchored with interrupted buried subcutaneous sutures. Double Z Plasty Text: The lesion was extirpated to the level of the fat with a #15 scalpel blade. Given the location of the defect, shape of the defect and the proximity to free margins a double Z-plasty was deemed most appropriate for repair. Using a sterile surgical marker, the appropriate transposition arms of the double Z-plasty were drawn incorporating the defect and placing the expected incisions within the relaxed skin tension lines where possible. The area thus outlined was incised deep to adipose tissue with a #15 scalpel blade. The skin margins were undermined to an appropriate distance in all directions utilizing iris scissors. The opposing transposition arms were then transposed and carried over into place in opposite direction and anchored with interrupted buried subcutaneous sutures. Zygomaticofacial Flap Text: Given the location of the defect, shape of the defect and the proximity to free margins a zygomaticofacial flap was deemed most appropriate for repair.  Using a sterile surgical marker, the appropriate flap was drawn incorporating the defect and placing the expected incisions within the relaxed skin tension lines where possible. The area thus outlined was incised deep to adipose tissue with a #15 scalpel blade with preservation of a vascular pedicle.  The skin margins were undermined to an appropriate distance in all directions utilizing iris scissors.  The flap was then placed into the defect and anchored with interrupted buried subcutaneous sutures. Cheek Interpolation Flap Text: A decision was made to reconstruct the defect utilizing an interpolation axial flap and a staged reconstruction.  A telfa template was made of the defect.  This telfa template was then used to outline the Cheek Interpolation flap.  The donor area for the pedicle flap was then injected with anesthesia.  The flap was excised through the skin and subcutaneous tissue down to the layer of the underlying musculature.  The interpolation flap was carefully excised within this deep plane to maintain its blood supply.  The edges of the donor site were undermined.   The donor site was closed in a primary fashion.  The pedicle was then rotated into position and sutured.  Once the tube was sutured into place, adequate blood supply was confirmed with blanching and refill.  The pedicle was then wrapped with xeroform gauze and dressed appropriately with a telfa and gauze bandage to ensure continued blood supply and protect the attached pedicle. Cheek-To-Nose Interpolation Flap Text: A decision was made to reconstruct the defect utilizing an interpolation axial flap and a staged reconstruction.  A telfa template was made of the defect.  This telfa template was then used to outline the Cheek-To-Nose Interpolation flap.  The donor area for the pedicle flap was then injected with anesthesia.  The flap was excised through the skin and subcutaneous tissue down to the layer of the underlying musculature.  The interpolation flap was carefully excised within this deep plane to maintain its blood supply.  The edges of the donor site were undermined.   The donor site was closed in a primary fashion.  The pedicle was then rotated into position and sutured.  Once the tube was sutured into place, adequate blood supply was confirmed with blanching and refill.  The pedicle was then wrapped with xeroform gauze and dressed appropriately with a telfa and gauze bandage to ensure continued blood supply and protect the attached pedicle. Interpolation Flap Text: A decision was made to reconstruct the defect utilizing an interpolation axial flap and a staged reconstruction.  A telfa template was made of the defect.  This telfa template was then used to outline the interpolation flap.  The donor area for the pedicle flap was then injected with anesthesia.  The flap was excised through the skin and subcutaneous tissue down to the layer of the underlying musculature.  The interpolation flap was carefully excised within this deep plane to maintain its blood supply.  The edges of the donor site were undermined.   The donor site was closed in a primary fashion.  The pedicle was then rotated into position and sutured.  Once the tube was sutured into place, adequate blood supply was confirmed with blanching and refill.  The pedicle was then wrapped with xeroform gauze and dressed appropriately with a telfa and gauze bandage to ensure continued blood supply and protect the attached pedicle. Melolabial Interpolation Flap Text: A decision was made to reconstruct the defect utilizing an interpolation axial flap and a staged reconstruction.  A telfa template was made of the defect.  This telfa template was then used to outline the melolabial interpolation flap.  The donor area for the pedicle flap was then injected with anesthesia.  The flap was excised through the skin and subcutaneous tissue down to the layer of the underlying musculature.  The pedicle flap was carefully excised within this deep plane to maintain its blood supply.  The edges of the donor site were undermined.   The donor site was closed in a primary fashion.  The pedicle was then rotated into position and sutured.  Once the tube was sutured into place, adequate blood supply was confirmed with blanching and refill.  The pedicle was then wrapped with xeroform gauze and dressed appropriately with a telfa and gauze bandage to ensure continued blood supply and protect the attached pedicle. Mastoid Interpolation Flap Text: A decision was made to reconstruct the defect utilizing an interpolation axial flap and a staged reconstruction.  A telfa template was made of the defect.  This telfa template was then used to outline the mastoid interpolation flap.  The donor area for the pedicle flap was then injected with anesthesia.  The flap was excised through the skin and subcutaneous tissue down to the layer of the underlying musculature.  The pedicle flap was carefully excised within this deep plane to maintain its blood supply.  The edges of the donor site were undermined.   The donor site was closed in a primary fashion.  The pedicle was then rotated into position and sutured.  Once the tube was sutured into place, adequate blood supply was confirmed with blanching and refill.  The pedicle was then wrapped with xeroform gauze and dressed appropriately with a telfa and gauze bandage to ensure continued blood supply and protect the attached pedicle. Posterior Auricular Interpolation Flap Text: A decision was made to reconstruct the defect utilizing an interpolation axial flap and a staged reconstruction.  A telfa template was made of the defect.  This telfa template was then used to outline the posterior auricular interpolation flap.  The donor area for the pedicle flap was then injected with anesthesia.  The flap was excised through the skin and subcutaneous tissue down to the layer of the underlying musculature.  The pedicle flap was carefully excised within this deep plane to maintain its blood supply.  The edges of the donor site were undermined.   The donor site was closed in a primary fashion.  The pedicle was then rotated into position and sutured.  Once the tube was sutured into place, adequate blood supply was confirmed with blanching and refill.  The pedicle was then wrapped with xeroform gauze and dressed appropriately with a telfa and gauze bandage to ensure continued blood supply and protect the attached pedicle. Paramedian Forehead Flap Text: A decision was made to reconstruct the defect utilizing an interpolation axial flap and a staged reconstruction.  A telfa template was made of the defect.  This telfa template was then used to outline the paramedian forehead pedicle flap.  The donor area for the pedicle flap was then injected with anesthesia.  The flap was excised through the skin and subcutaneous tissue down to the layer of the underlying musculature.  The pedicle flap was carefully excised within this deep plane to maintain its blood supply.  The edges of the donor site were undermined.   The donor site was closed in a primary fashion.  The pedicle was then rotated into position and sutured.  Once the tube was sutured into place, adequate blood supply was confirmed with blanching and refill.  The pedicle was then wrapped with xeroform gauze and dressed appropriately with a telfa and gauze bandage to ensure continued blood supply and protect the attached pedicle. Abbe Flap (Upper To Lower Lip) Text: The defect of the lower lip was assessed and measured.  Given the location and size of the defect, an Abbe flap was deemed most appropriate. Using a sterile surgical marker, an appropriate Abbe flap was measured and drawn on the upper lip. Local anesthesia was then infiltrated.  A scalpel was then used to incise the upper lip through and through the skin, vermilion, muscle and mucosa, leaving the flap pedicled on the opposite side.  The flap was then rotated and transferred to the lower lip defect.  The flap was then sutured into place with a three layer technique, closing the orbicularis oris muscle layer with subcutaneous buried sutures, followed by a mucosal layer and an epidermal layer. Abbe Flap (Lower To Upper Lip) Text: The defect of the upper lip was assessed and measured.  Given the location and size of the defect, an Abbe flap was deemed most appropriate. Using a sterile surgical marker, an appropriate Abbe flap was measured and drawn on the lower lip. Local anesthesia was then infiltrated. A scalpel was then used to incise the upper lip through and through the skin, vermilion, muscle and mucosa, leaving the flap pedicled on the opposite side.  The flap was then rotated and transferred to the lower lip defect.  The flap was then sutured into place with a three layer technique, closing the orbicularis oris muscle layer with subcutaneous buried sutures, followed by a mucosal layer and an epidermal layer. Estlander Flap (Upper To Lower Lip) Text: The defect of the lower lip was assessed and measured.  Given the location and size of the defect, an Estlander flap was deemed most appropriate. Using a sterile surgical marker, an appropriate Estlander flap was measured and drawn on the upper lip. Local anesthesia was then infiltrated. A scalpel was then used to incise the lateral aspect of the flap, through skin, muscle and mucosa, leaving the flap pedicled medially.  The flap was then rotated and positioned to fill the lower lip defect.  The flap was then sutured into place with a three layer technique, closing the orbicularis oris muscle layer with subcutaneous buried sutures, followed by a mucosal layer and an epidermal layer. Lip Wedge Excision Repair Text: Given the location of the defect and the proximity to free margins a full thickness wedge repair was deemed most appropriate.  Using a sterile surgical marker, the appropriate repair was drawn incorporating the defect and placing the expected incisions perpendicular to the vermilion border.  The vermilion border was also meticulously outlined to ensure appropriate reapproximation during the repair.  The area thus outlined was incised through and through with a #15 scalpel blade.  The muscularis and dermis were reaproximated with deep sutures following hemostasis. Care was taken to realign the vermilion border before proceeding with the superficial closure.  Once the vermilion was realigned the superfical and mucosal closure was finished. Ftsg Text: The defect edges were debeveled with a #15 scalpel blade.  Given the location of the defect, shape of the defect and the proximity to free margins a full thickness skin graft was deemed most appropriate.  Using a sterile surgical marker, the primary defect shape was transferred to the donor site. The area thus outlined was incised deep to adipose tissue with a #15 scalpel blade.  The harvested graft was then trimmed of adipose tissue until only dermis and epidermis was left.  The skin margins of the secondary defect were undermined to an appropriate distance in all directions utilizing iris scissors.  The secondary defect was closed with interrupted buried subcutaneous sutures.  The skin edges were then re-apposed with running  sutures.  The skin graft was then placed in the primary defect and oriented appropriately. Split-Thickness Skin Graft Text: The defect edges were debeveled with a #15 scalpel blade.  Given the location of the defect, shape of the defect and the proximity to free margins a split thickness skin graft was deemed most appropriate.  Using a sterile surgical marker, the primary defect shape was transferred to the donor site. The split thickness graft was then harvested.  The skin graft was then placed in the primary defect and oriented appropriately. Pinch Graft Text: The defect edges were debeveled with a #15 scalpel blade. Given the location of the defect, shape of the defect and the proximity to free margins a pinch graft was deemed most appropriate. Using a sterile surgical marker, the primary defect shape was transferred to the donor site. The area thus outlined was incised deep to adipose tissue with a #15 scalpel blade.  The harvested graft was then trimmed of adipose tissue until only dermis and epidermis was left. The skin graft was then placed in the primary defect and oriented appropriately. Burow's Graft Text: The defect edges were debeveled with a #15 scalpel blade.  Given the location of the defect, shape of the defect, the proximity to free margins and the presence of a standing cone deformity a Burow's skin graft was deemed most appropriate. The standing cone was removed and this tissue was then trimmed to the shape of the primary defect. The adipose tissue was also removed until only dermis and epidermis were left.  The skin margins of the secondary defect were undermined to an appropriate distance in all directions utilizing iris scissors.  The secondary defect was closed with interrupted buried subcutaneous sutures.  The skin edges were then re-apposed with running  sutures.  The skin graft was then placed in the primary defect and oriented appropriately. Cartilage Graft Text: The defect edges were debeveled with a #15 scalpel blade.  Given the location of the defect, shape of the defect, the fact the defect involved a full thickness cartilage defect a cartilage graft was deemed most appropriate.  An appropriate donor site was identified, cleansed, and anesthetized. The cartilage graft was then harvested and transferred to the recipient site, oriented appropriately and then sutured into place.  The secondary defect was then repaired using a primary closure. Composite Graft Text: The defect edges were debeveled with a #15 scalpel blade.  Given the location of the defect, shape of the defect, the proximity to free margins and the fact the defect was full thickness a composite graft was deemed most appropriate.  The defect was outline and then transferred to the donor site.  A full thickness graft was then excised from the donor site. The graft was then placed in the primary defect, oriented appropriately and then sutured into place.  The secondary defect was then repaired using a primary closure. Epidermal Autograft Text: The defect edges were debeveled with a #15 scalpel blade.  Given the location of the defect, shape of the defect and the proximity to free margins an epidermal autograft was deemed most appropriate.  Using a sterile surgical marker, the primary defect shape was transferred to the donor site. The epidermal graft was then harvested.  The skin graft was then placed in the primary defect and oriented appropriately. Dermal Autograft Text: The defect edges were debeveled with a #15 scalpel blade.  Given the location of the defect, shape of the defect and the proximity to free margins a dermal autograft was deemed most appropriate.  Using a sterile surgical marker, the primary defect shape was transferred to the donor site. The area thus outlined was incised deep to adipose tissue with a #15 scalpel blade.  The harvested graft was then trimmed of adipose and epidermal tissue until only dermis was left.  The skin graft was then placed in the primary defect and oriented appropriately. Skin Substitute Text: The defect edges were debeveled with a #15 scalpel blade.  Given the location of the defect, shape of the defect and the proximity to free margins a skin substitute graft was deemed most appropriate.  The graft material was trimmed to fit the size of the defect. The graft was then placed in the primary defect and oriented appropriately. Tissue Cultured Epidermal Autograft Text: The defect edges were debeveled with a #15 scalpel blade.  Given the location of the defect, shape of the defect and the proximity to free margins a tissue cultured epidermal autograft was deemed most appropriate.  The graft was then trimmed to fit the size of the defect.  The graft was then placed in the primary defect and oriented appropriately. Xenograft Text: The defect edges were debeveled with a #15 scalpel blade.  Given the location of the defect, shape of the defect and the proximity to free margins a xenograft was deemed most appropriate.  The graft was then trimmed to fit the size of the defect.  The graft was then placed in the primary defect and oriented appropriately. Purse String (Intermediate) Text: Given the location of the defect and the characteristics of the surrounding skin a purse string intermediate closure was deemed most appropriate.  Undermining was performed circumfirentially around the surgical defect.  A purse string suture was then placed and tightened. Purse String (Simple) Text: Given the location of the defect and the characteristics of the surrounding skin a purse string simple closure was deemed most appropriate.  Undermining was performed circumferentially around the surgical defect.  A purse string suture was then placed and tightened. Partial Purse String (Intermediate) Text: Given the location of the defect and the characteristics of the surrounding skin an intermediate purse string closure was deemed most appropriate.  Undermining was performed circumferentially around the surgical defect.  A purse string suture was then placed and tightened. Wound tension of the circular defect prevented complete closure of the wound. Partial Purse String (Simple) Text: Given the location of the defect and the characteristics of the surrounding skin a simple purse string closure was deemed most appropriate.  Undermining was performed circumferentially around the surgical defect.  A purse string suture was then placed and tightened. Wound tension of the circular defect prevented complete closure of the wound. Complex Repair And Single Advancement Flap Text: The defect edges were debeveled with a #15 scalpel blade.  The primary defect was closed partially with a complex linear closure.  Given the location of the remaining defect, shape of the defect and the proximity to free margins a single advancement flap was deemed most appropriate for complete closure of the defect.  Using a sterile surgical marker, an appropriate advancement flap was drawn incorporating the defect and placing the expected incisions within the relaxed skin tension lines where possible.    The area thus outlined was incised deep to adipose tissue with a #15 scalpel blade.  The skin margins were undermined to an appropriate distance in all directions utilizing iris scissors. Complex Repair And Double Advancement Flap Text: The defect edges were debeveled with a #15 scalpel blade.  The primary defect was closed partially with a complex linear closure.  Given the location of the remaining defect, shape of the defect and the proximity to free margins a double advancement flap was deemed most appropriate for complete closure of the defect.  Using a sterile surgical marker, an appropriate advancement flap was drawn incorporating the defect and placing the expected incisions within the relaxed skin tension lines where possible.    The area thus outlined was incised deep to adipose tissue with a #15 scalpel blade.  The skin margins were undermined to an appropriate distance in all directions utilizing iris scissors. Complex Repair And Modified Advancement Flap Text: The defect edges were debeveled with a #15 scalpel blade.  The primary defect was closed partially with a complex linear closure.  Given the location of the remaining defect, shape of the defect and the proximity to free margins a modified advancement flap was deemed most appropriate for complete closure of the defect.  Using a sterile surgical marker, an appropriate advancement flap was drawn incorporating the defect and placing the expected incisions within the relaxed skin tension lines where possible.    The area thus outlined was incised deep to adipose tissue with a #15 scalpel blade.  The skin margins were undermined to an appropriate distance in all directions utilizing iris scissors. Complex Repair And A-T Advancement Flap Text: The defect edges were debeveled with a #15 scalpel blade.  The primary defect was closed partially with a complex linear closure.  Given the location of the remaining defect, shape of the defect and the proximity to free margins an A-T advancement flap was deemed most appropriate for complete closure of the defect.  Using a sterile surgical marker, an appropriate advancement flap was drawn incorporating the defect and placing the expected incisions within the relaxed skin tension lines where possible.    The area thus outlined was incised deep to adipose tissue with a #15 scalpel blade.  The skin margins were undermined to an appropriate distance in all directions utilizing iris scissors. Complex Repair And O-T Advancement Flap Text: The defect edges were debeveled with a #15 scalpel blade.  The primary defect was closed partially with a complex linear closure.  Given the location of the remaining defect, shape of the defect and the proximity to free margins an O-T advancement flap was deemed most appropriate for complete closure of the defect.  Using a sterile surgical marker, an appropriate advancement flap was drawn incorporating the defect and placing the expected incisions within the relaxed skin tension lines where possible.    The area thus outlined was incised deep to adipose tissue with a #15 scalpel blade.  The skin margins were undermined to an appropriate distance in all directions utilizing iris scissors. Complex Repair And O-L Flap Text: The defect edges were debeveled with a #15 scalpel blade.  The primary defect was closed partially with a complex linear closure.  Given the location of the remaining defect, shape of the defect and the proximity to free margins an O-L flap was deemed most appropriate for complete closure of the defect.  Using a sterile surgical marker, an appropriate flap was drawn incorporating the defect and placing the expected incisions within the relaxed skin tension lines where possible.    The area thus outlined was incised deep to adipose tissue with a #15 scalpel blade.  The skin margins were undermined to an appropriate distance in all directions utilizing iris scissors. Complex Repair And Bilobe Flap Text: The defect edges were debeveled with a #15 scalpel blade.  The primary defect was closed partially with a complex linear closure.  Given the location of the remaining defect, shape of the defect and the proximity to free margins a bilobe flap was deemed most appropriate for complete closure of the defect.  Using a sterile surgical marker, an appropriate advancement flap was drawn incorporating the defect and placing the expected incisions within the relaxed skin tension lines where possible.    The area thus outlined was incised deep to adipose tissue with a #15 scalpel blade.  The skin margins were undermined to an appropriate distance in all directions utilizing iris scissors. Complex Repair And Melolabial Flap Text: The defect edges were debeveled with a #15 scalpel blade.  The primary defect was closed partially with a complex linear closure.  Given the location of the remaining defect, shape of the defect and the proximity to free margins a melolabial flap was deemed most appropriate for complete closure of the defect.  Using a sterile surgical marker, an appropriate advancement flap was drawn incorporating the defect and placing the expected incisions within the relaxed skin tension lines where possible.    The area thus outlined was incised deep to adipose tissue with a #15 scalpel blade.  The skin margins were undermined to an appropriate distance in all directions utilizing iris scissors. Complex Repair And Rotation Flap Text: The defect edges were debeveled with a #15 scalpel blade.  The primary defect was closed partially with a complex linear closure.  Given the location of the remaining defect, shape of the defect and the proximity to free margins a rotation flap was deemed most appropriate for complete closure of the defect.  Using a sterile surgical marker, an appropriate advancement flap was drawn incorporating the defect and placing the expected incisions within the relaxed skin tension lines where possible.    The area thus outlined was incised deep to adipose tissue with a #15 scalpel blade.  The skin margins were undermined to an appropriate distance in all directions utilizing iris scissors. Complex Repair And Rhombic Flap Text: The defect edges were debeveled with a #15 scalpel blade.  The primary defect was closed partially with a complex linear closure.  Given the location of the remaining defect, shape of the defect and the proximity to free margins a rhombic flap was deemed most appropriate for complete closure of the defect.  Using a sterile surgical marker, an appropriate advancement flap was drawn incorporating the defect and placing the expected incisions within the relaxed skin tension lines where possible.    The area thus outlined was incised deep to adipose tissue with a #15 scalpel blade.  The skin margins were undermined to an appropriate distance in all directions utilizing iris scissors. Complex Repair And Transposition Flap Text: The defect edges were debeveled with a #15 scalpel blade.  The primary defect was closed partially with a complex linear closure.  Given the location of the remaining defect, shape of the defect and the proximity to free margins a transposition flap was deemed most appropriate for complete closure of the defect.  Using a sterile surgical marker, an appropriate advancement flap was drawn incorporating the defect and placing the expected incisions within the relaxed skin tension lines where possible.    The area thus outlined was incised deep to adipose tissue with a #15 scalpel blade.  The skin margins were undermined to an appropriate distance in all directions utilizing iris scissors. Complex Repair And V-Y Plasty Text: The defect edges were debeveled with a #15 scalpel blade.  The primary defect was closed partially with a complex linear closure.  Given the location of the remaining defect, shape of the defect and the proximity to free margins a V-Y plasty was deemed most appropriate for complete closure of the defect.  Using a sterile surgical marker, an appropriate advancement flap was drawn incorporating the defect and placing the expected incisions within the relaxed skin tension lines where possible.    The area thus outlined was incised deep to adipose tissue with a #15 scalpel blade.  The skin margins were undermined to an appropriate distance in all directions utilizing iris scissors. Complex Repair And M Plasty Text: The defect edges were debeveled with a #15 scalpel blade.  The primary defect was closed partially with a complex linear closure.  Given the location of the remaining defect, shape of the defect and the proximity to free margins an M plasty was deemed most appropriate for complete closure of the defect.  Using a sterile surgical marker, an appropriate advancement flap was drawn incorporating the defect and placing the expected incisions within the relaxed skin tension lines where possible.    The area thus outlined was incised deep to adipose tissue with a #15 scalpel blade.  The skin margins were undermined to an appropriate distance in all directions utilizing iris scissors. Complex Repair And Double M Plasty Text: The defect edges were debeveled with a #15 scalpel blade.  The primary defect was closed partially with a complex linear closure.  Given the location of the remaining defect, shape of the defect and the proximity to free margins a double M plasty was deemed most appropriate for complete closure of the defect.  Using a sterile surgical marker, an appropriate advancement flap was drawn incorporating the defect and placing the expected incisions within the relaxed skin tension lines where possible.    The area thus outlined was incised deep to adipose tissue with a #15 scalpel blade.  The skin margins were undermined to an appropriate distance in all directions utilizing iris scissors. Complex Repair And W Plasty Text: The defect edges were debeveled with a #15 scalpel blade.  The primary defect was closed partially with a complex linear closure.  Given the location of the remaining defect, shape of the defect and the proximity to free margins a W plasty was deemed most appropriate for complete closure of the defect.  Using a sterile surgical marker, an appropriate advancement flap was drawn incorporating the defect and placing the expected incisions within the relaxed skin tension lines where possible.    The area thus outlined was incised deep to adipose tissue with a #15 scalpel blade.  The skin margins were undermined to an appropriate distance in all directions utilizing iris scissors. Complex Repair And Z Plasty Text: The defect edges were debeveled with a #15 scalpel blade.  The primary defect was closed partially with a complex linear closure.  Given the location of the remaining defect, shape of the defect and the proximity to free margins a Z plasty was deemed most appropriate for complete closure of the defect.  Using a sterile surgical marker, an appropriate advancement flap was drawn incorporating the defect and placing the expected incisions within the relaxed skin tension lines where possible.    The area thus outlined was incised deep to adipose tissue with a #15 scalpel blade.  The skin margins were undermined to an appropriate distance in all directions utilizing iris scissors. Complex Repair And Dorsal Nasal Flap Text: The defect edges were debeveled with a #15 scalpel blade.  The primary defect was closed partially with a complex linear closure.  Given the location of the remaining defect, shape of the defect and the proximity to free margins a dorsal nasal flap was deemed most appropriate for complete closure of the defect.  Using a sterile surgical marker, an appropriate flap was drawn incorporating the defect and placing the expected incisions within the relaxed skin tension lines where possible.    The area thus outlined was incised deep to adipose tissue with a #15 scalpel blade.  The skin margins were undermined to an appropriate distance in all directions utilizing iris scissors. Complex Repair And Ftsg Text: The defect edges were debeveled with a #15 scalpel blade.  The primary defect was closed partially with a complex linear closure.  Given the location of the defect, shape of the defect and the proximity to free margins a full thickness skin graft was deemed most appropriate to repair the remaining defect.  The graft was trimmed to fit the size of the remaining defect.  The graft was then placed in the primary defect, oriented appropriately, and sutured into place. Complex Repair And Burow's Graft Text: The defect edges were debeveled with a #15 scalpel blade.  The primary defect was closed partially with a complex linear closure.  Given the location of the defect, shape of the defect, the proximity to free margins and the presence of a standing cone deformity a Burow's graft was deemed most appropriate to repair the remaining defect.  The graft was trimmed to fit the size of the remaining defect.  The graft was then placed in the primary defect, oriented appropriately, and sutured into place. Complex Repair And Split-Thickness Skin Graft Text: The defect edges were debeveled with a #15 scalpel blade.  The primary defect was closed partially with a complex linear closure.  Given the location of the defect, shape of the defect and the proximity to free margins a split thickness skin graft was deemed most appropriate to repair the remaining defect.  The graft was trimmed to fit the size of the remaining defect.  The graft was then placed in the primary defect, oriented appropriately, and sutured into place. Complex Repair And Epidermal Autograft Text: The defect edges were debeveled with a #15 scalpel blade.  The primary defect was closed partially with a complex linear closure.  Given the location of the defect, shape of the defect and the proximity to free margins an epidermal autograft was deemed most appropriate to repair the remaining defect.  The graft was trimmed to fit the size of the remaining defect.  The graft was then placed in the primary defect, oriented appropriately, and sutured into place. Complex Repair And Dermal Autograft Text: The defect edges were debeveled with a #15 scalpel blade.  The primary defect was closed partially with a complex linear closure.  Given the location of the defect, shape of the defect and the proximity to free margins an dermal autograft was deemed most appropriate to repair the remaining defect.  The graft was trimmed to fit the size of the remaining defect.  The graft was then placed in the primary defect, oriented appropriately, and sutured into place. Complex Repair And Tissue Cultured Epidermal Autograft Text: The defect edges were debeveled with a #15 scalpel blade.  The primary defect was closed partially with a complex linear closure.  Given the location of the defect, shape of the defect and the proximity to free margins an tissue cultured epidermal autograft was deemed most appropriate to repair the remaining defect.  The graft was trimmed to fit the size of the remaining defect.  The graft was then placed in the primary defect, oriented appropriately, and sutured into place. Complex Repair And Xenograft Text: The defect edges were debeveled with a #15 scalpel blade.  The primary defect was closed partially with a complex linear closure.  Given the location of the defect, shape of the defect and the proximity to free margins a xenograft was deemed most appropriate to repair the remaining defect.  The graft was trimmed to fit the size of the remaining defect.  The graft was then placed in the primary defect, oriented appropriately, and sutured into place. Complex Repair And Skin Substitute Graft Text: The defect edges were debeveled with a #15 scalpel blade.  The primary defect was closed partially with a complex linear closure.  Given the location of the remaining defect, shape of the defect and the proximity to free margins a skin substitute graft was deemed most appropriate to repair the remaining defect.  The graft was trimmed to fit the size of the remaining defect.  The graft was then placed in the primary defect, oriented appropriately, and sutured into place. Include Anticoagulation In Mohs Note?: Please Select the Appropriate Response Path Notes (To The Dermatopathologist): Please check margins. Tagged at 12 o'clock Consent was obtained from the patient. The risks and benefits to therapy were discussed in detail. Specifically, the risks of infection, scarring, bleeding, prolonged wound healing, incomplete removal, allergy to anesthesia, nerve injury and recurrence were addressed. Prior to the procedure, the treatment site was clearly identified and confirmed by the patient. All components of Universal Protocol/PAUSE Rule completed. Post-Care Instructions: I reviewed with the patient in detail post-care instructions. Patient is not to engage in any heavy lifting, exercise, or swimming for the next 14 days. Should the patient develop any fevers, chills, bleeding, severe pain patient will contact the office immediately. Home Suture Removal Text: Patient was provided a home suture removal kit and will remove their sutures at home.  If they have any questions or difficulties they will call the office. Where Do You Want The Question To Include Opioid Counseling Located?: Case Summary Tab Information: Selecting Yes will display possible errors in your note based on the variables you have selected. This validation is only offered as a suggestion for you. PLEASE NOTE THAT THE VALIDATION TEXT WILL BE REMOVED WHEN YOU FINALIZE YOUR NOTE. IF YOU WANT TO FAX A PRELIMINARY NOTE YOU WILL NEED TO TOGGLE THIS TO 'NO' IF YOU DO NOT WANT IT IN YOUR FAXED NOTE.

## (undated) DEVICE — MARKING PEN W RULER

## (undated) DEVICE — SUCTION YANKAUER BULBOUS TIP W VENT

## (undated) DEVICE — LIGASURE IMPACT

## (undated) DEVICE — TUBING STRYKER PNEUMOCLEAR SMOKE EVACUATION HIGH FLOW

## (undated) DEVICE — VENODYNE/SCD SLEEVE CALF MEDIUM

## (undated) DEVICE — FOLEY TRAY 16FR 5CC LF UMETER CLOSED

## (undated) DEVICE — SUT VLOC 180 0 12" GS-21 GREEN

## (undated) DEVICE — SYR ASEPTO

## (undated) DEVICE — STOPCOCK 4-WAY

## (undated) DEVICE — SPECIMEN CONTAINER 4OZ

## (undated) DEVICE — Device

## (undated) DEVICE — GOWN ROYAL SILK XL

## (undated) DEVICE — LUBRICATING JELLY ONESHOT 1.25OZ

## (undated) DEVICE — SUT VICRYL PLUS 0 36" CT-1

## (undated) DEVICE — SUT MONOCRYL 4-0 27" PS-2 UNDYED

## (undated) DEVICE — DRAPE TOWEL BLUE 17" X 24"

## (undated) DEVICE — ENDOCATCH 10MM SPECIMEN POUCH

## (undated) DEVICE — DRSG DERMABOND 0.7ML

## (undated) DEVICE — STAPLER SKIN PROXIMATE

## (undated) DEVICE — DRAPE TOWEL BLUE STICKY

## (undated) DEVICE — STAPLER COVIDIEN ENDO GIA STANDARD HANDLE

## (undated) DEVICE — WARMING BLANKET UPPER ADULT

## (undated) DEVICE — NDL GRASPING DISP

## (undated) DEVICE — SUT VICRYL 3-0 27" SH UNDYED

## (undated) DEVICE — UTERINE MANIPULATOR CONMED VCARE MED 34MM

## (undated) DEVICE — ELCTR BOVIE PENCIL HANDPIECE ROCKER SWITCH 15FT

## (undated) DEVICE — TROCAR APPLIED MEDICAL KII BALLOON BLUNT TIP 12MM X 100MM

## (undated) DEVICE — DRSG STERISTRIPS 0.5 X 4"

## (undated) DEVICE — SUT SILK 0 30" TIES

## (undated) DEVICE — PACK GENERAL LAPAROSCOPY

## (undated) DEVICE — POSITIONER STRAP KNEE & BODY 3X60" DISP

## (undated) DEVICE — POSITIONER FOAM EGG CRATE ULNAR 2PCS (PINK)

## (undated) DEVICE — ELCTR BOVIE TIP BLADE VALLEYLAB 6.5"

## (undated) DEVICE — TROCAR ETHICON ENDOPATH XCEL BLADELESS 5MM X 100MM STABILITY

## (undated) DEVICE — SUT VICRYL 0 27" UR-6

## (undated) DEVICE — TUBING STRYKER PNEUMOSURE HI FLOW INSUFFLATOR

## (undated) DEVICE — POSITIONER PINK PAD PIGAZZI SYSTEM XL W ARM PROTECTOR

## (undated) DEVICE — TROCAR COVIDIEN VERSAONE FIXATION CANNULA 5MM

## (undated) DEVICE — TUBING TUR 2 PRONG

## (undated) DEVICE — SUT SILK 3-0 30" TIES

## (undated) DEVICE — SYR LUER LOK 30CC

## (undated) DEVICE — PACK PERI GYN

## (undated) DEVICE — NDL SPINAL 22G X 3.5" (BLACK)

## (undated) DEVICE — GLV 6.5 PROTEXIS (WHITE)

## (undated) DEVICE — TROCAR COVIDIEN VERSAPORT BLADELESS OPTICAL 5MM STANDARD

## (undated) DEVICE — DRAPE 1/2 SHEET 40X57"

## (undated) DEVICE — WARMING BLANKET LOWER ADULT

## (undated) DEVICE — POOLE SUCTION TIP

## (undated) DEVICE — TROCAR ETHICON ENDOPATH XCEL BLADELESS 12MM X 100MM STABILITY

## (undated) DEVICE — TIP METZENBAUM SCISSOR MONOPOLAR ENDOCUT (ORANGE)

## (undated) DEVICE — TROCAR ETHICON ENDOPATH XCEL UNIVERSAL SLEEVE WITH OPTIVIEW 5MM X 100MM

## (undated) DEVICE — RUMI COLPO-PNEUMO OCCLUDER

## (undated) DEVICE — NDL HYPO SAFE 22G X 1.5" (BLACK)

## (undated) DEVICE — DRAPE 3/4 SHEET 52X76"

## (undated) DEVICE — STAPLER COVIDIEN ENDO GIA SHORT HANDLE

## (undated) DEVICE — LIGASURE MARYLAND 37CM

## (undated) DEVICE — INSUFFLATION NDL COVIDIEN SURGINEEDLE VERESS 120MM

## (undated) DEVICE — SUT PROLENE 4-0 36" SH

## (undated) DEVICE — GLV 7 PROTEXIS (WHITE)

## (undated) DEVICE — GLV 8 PROTEXIS (WHITE)

## (undated) DEVICE — PACK EXPLORATORY LAPAROTOMY

## (undated) DEVICE — SPONGE ENDO PEANUT 5MM

## (undated) DEVICE — SUT PDS II 0 18" ENDOLOOP LIGATURE

## (undated) DEVICE — SUT SILK 3-0 30" SH

## (undated) DEVICE — SUT SILK 4-0 30" SH

## (undated) DEVICE — LIGASURE BLUNT TIP 37CM

## (undated) DEVICE — GLV 7.5 PROTEXIS (WHITE)

## (undated) DEVICE — SUT PDS II PLUS 1 96" TP-1

## (undated) DEVICE — APPLICATOR SURGICEL LAP TROCAR POINT 2.5MM X 150MM

## (undated) DEVICE — SUT MONOCRYL 4-0 18" PS-2

## (undated) DEVICE — SOL IRR BAG NS 0.9% 3000ML

## (undated) DEVICE — GOWN XL

## (undated) DEVICE — SUT SILK 2-0 30" TIES